# Patient Record
Sex: MALE | Race: WHITE | NOT HISPANIC OR LATINO | Employment: FULL TIME | URBAN - METROPOLITAN AREA
[De-identification: names, ages, dates, MRNs, and addresses within clinical notes are randomized per-mention and may not be internally consistent; named-entity substitution may affect disease eponyms.]

---

## 2017-01-25 ENCOUNTER — TELEPHONE (OUTPATIENT)
Dept: MEDICAL GROUP | Facility: PHYSICIAN GROUP | Age: 47
End: 2017-01-25

## 2017-01-25 NOTE — TELEPHONE ENCOUNTER
Called Jonn Arroyo and left a message to return my call regarding his/her upcoming NP appointment with Anjelica Gannon M.D..   If patient returns the call please transfer them to extension: [phone]@8:32

## 2017-01-27 NOTE — TELEPHONE ENCOUNTER
NEW PATIENT PRE-VISIT PLANNING    Called Jonn Arroyo in order to verify health topics prior to the New appointment.     1.  All medications were updated? yes    2.  Allergies were updated? yes    3.  All care teams were updated? no       •   Gait devices, O2, CPAP, etc: no        •   Eye professional: no       •   Other specialists (GYN, cardiology, endo, etc): no    4.  All pharmacies were updated? yes          No current outpatient prescriptions on file.     No current facility-administered medications for this visit.       5.  Patient may be due for these Health Maintenance Topics (update any if possible):            Health Maintenance Due   Topic Date Due   • IMM DTaP/Tdap/Td Vaccine (1 - Tdap) 04/13/1990   • IMM INFLUENZA (1) 09/01/2016                    6.  Immunizations were updated in [facility] using WebIZ?: Yes        a. Web Iz Recommendations: Tdap, Influenza          7.  Former PCP records requested?: N\A       a. If yes, request was sent to        8.  Notes to provider or MA:       deyanira         b.        Pt was encouraged to keep the appointment and to arrive at least 15-20 minutes early.

## 2017-01-27 NOTE — TELEPHONE ENCOUNTER
Called Jonn Arroyo and left a message to return my call regarding his/her upcoming NP appointment with Anjelica Gannon M.D..   If patient returns the call please transfer them to extension: [phone] @3:00

## 2017-02-02 ENCOUNTER — OFFICE VISIT (OUTPATIENT)
Dept: MEDICAL GROUP | Facility: PHYSICIAN GROUP | Age: 47
End: 2017-02-02
Payer: COMMERCIAL

## 2017-02-02 VITALS
OXYGEN SATURATION: 93 % | TEMPERATURE: 98.6 F | BODY MASS INDEX: 30.06 KG/M2 | DIASTOLIC BLOOD PRESSURE: 80 MMHG | WEIGHT: 210 LBS | HEART RATE: 82 BPM | RESPIRATION RATE: 16 BRPM | HEIGHT: 70 IN | SYSTOLIC BLOOD PRESSURE: 150 MMHG

## 2017-02-02 DIAGNOSIS — Z13.220 SCREENING, LIPID: ICD-10-CM

## 2017-02-02 DIAGNOSIS — Z00.00 ROUTINE PHYSICAL EXAMINATION: ICD-10-CM

## 2017-02-02 DIAGNOSIS — J06.9 ACUTE URI: ICD-10-CM

## 2017-02-02 DIAGNOSIS — Z13.1 SCREENING FOR DIABETES MELLITUS (DM): ICD-10-CM

## 2017-02-02 PROCEDURE — 99396 PREV VISIT EST AGE 40-64: CPT | Performed by: FAMILY MEDICINE

## 2017-02-02 ASSESSMENT — PATIENT HEALTH QUESTIONNAIRE - PHQ9: CLINICAL INTERPRETATION OF PHQ2 SCORE: 0

## 2017-02-02 NOTE — MR AVS SNAPSHOT
"        Jonn Arroyo   2017 2:00 PM   Office Visit   MRN: [id number]    Department:  [facility]   Dept Phone:  [phone]    Description:  Male : 1970   Provider:  Anjelica Gannon M.D.           Reason for Visit     Establish Care new pt       Allergies as of 2017     No Known Allergies      You were diagnosed with     Routine physical examination   [986945]       Acute URI   [062612]       Screening for diabetes mellitus (DM)   [421905]       Screening, lipid   [613524]         Vital Signs     Blood Pressure Pulse Temperature Respirations Height Weight    150/80 mmHg 82 37 °C (98.6 °F) 16 1.778 m (5' 10\") 95.255 kg (210 lb)    Body Mass Index Oxygen Saturation Smoking Status             30.13 kg/m2 93% Former Smoker         Basic Information     Date Of Birth Sex Race Ethnicity Preferred Language    1970 Male White Non- English      Health Maintenance        Date Due Completion Dates    IMM DTaP/Tdap/Td Vaccine (1 - Tdap) 1990    IMM INFLUENZA (1) 2018 (Originally 2016) 10/6/2011            Current Immunizations     DTP 1972    Influenza Vaccine Quad Inj (Preserved) 10/6/2011    MMR Vaccine 1992    Measles Vaccine-Historical Data 10/6/1972    OPV - Historical Data 1972      Below and/or attached are the medications your provider expects you to take. Review all of your home medications and newly ordered medications with your provider and/or pharmacist. Follow medication instructions as directed by your provider and/or pharmacist. Please keep your medication list with you and share with your provider. Update the information when medications are discontinued, doses are changed, or new medications (including over-the-counter products) are added; and carry medication information at all times in the event of emergency situations     Allergies:  No Known Allergies          Medications  Valid as of: 2017 -  3:14 PM    Generic Name " Brand Name Tablet Size Instructions for use    .                 Medicines prescribed today were sent to:     [facility] [address] [place] [address], [place] - [address] [facility]    [address] [facility] [place] [address]    Phone: [phone] Fax: [phone]    Open 24 Hours?: No      Medication refill instructions:       If your prescription bottle indicates you have medication refills left, it is not necessary to call your provider’s office. Please contact your pharmacy and they will refill your medication.    If your prescription bottle indicates you do not have any refills left, you may request refills at any time through one of the following ways: The online VeriTran system (except Urgent Care), by calling your provider’s office, or by asking your pharmacy to contact your provider’s office with a refill request. Medication refills are processed only during regular business hours and may not be available until the next business day. Your provider may request additional information or to have a follow-up visit with you prior to refilling your medication.   *Please Note: Medication refills are assigned a new Rx number when refilled electronically. Your pharmacy may indicate that no refills were authorized even though a new prescription for the same medication is available at the pharmacy. Please request the medicine by name with the pharmacy before contacting your provider for a refill.        Your To Do List     Future Labs/Procedures Complete By Expires    CBC WITH DIFFERENTIAL  As directed 2/3/2018    COMP METABOLIC PANEL  As directed 2/3/2018    LIPID PROFILE  As directed 2/3/2018         VeriTran Access Code: Activation code not generated  Current VeriTran Status: Active

## 2017-02-03 DIAGNOSIS — J20.9 ACUTE BRONCHITIS, UNSPECIFIED ORGANISM: ICD-10-CM

## 2017-02-03 RX ORDER — AZITHROMYCIN 250 MG/1
TABLET, FILM COATED ORAL
Qty: 6 TAB | Refills: 0 | Status: SHIPPED | OUTPATIENT
Start: 2017-02-03 | End: 2018-05-08

## 2017-02-03 NOTE — PROGRESS NOTES
Subjective:      Jonn Arroyo is a 46 y.o. male who presents with Establish Care            HPI       This is a 46-year-old white male who is here as a new patient to get established with me and to have physical exam. He said he was previously under the care of Dr. Mcghee.    He said he had stomach virus with diarrhea last week which cleared. He started having nasal congestion with yellowish to greenish nasal discharge and mild sinus pressure 3 days ago. He also started coughing with yellowish phlegm about 2 days ago. Today he started to feel better. He had some chills but no fever. He denies any shortness of breath. He has been taking [employer].    He is otherwise without any significant medical problems.    He said he is in between jobs right now.    I reviewed the following    History reviewed. No pertinent past medical history.     History reviewed. No pertinent past surgical history.    No Known Allergies    No current outpatient prescriptions on file.     No current facility-administered medications for this visit.        Family History   Problem Relation Age of Onset   • Cancer Father      thyroid       Social History     Social History   • Marital Status: Single     Spouse Name: N/A   • Number of Children: 1   • Years of Education: N/A     Occupational History   • Not on file.     Social History Main Topics   • Smoking status: Former Smoker -- 0.50 packs/day for 6 years     Quit date: 04/27/2009   • Smokeless tobacco: Former User   • Alcohol Use: No   • Drug Use: No   • Sexual Activity:     Partners: Female     Other Topics Concern   • Not on file     Social History Narrative        ROS     Review of Systems  Constitutional: Negative for fever, positive chills, negative weight loss and malaise/fatigue.   HEENT: Negative for ear pain, nosebleeds, sore throat and neck pain. Positive nasal congestion, positive nasal discharge    Eyes: Negative for blurred vision.   Respiratory: Positive for cough,  "sputum production, no shortness of breath and wheezing.    Cardiovascular: Negative for chest pain, palpitations, orthopnea and leg swelling.   Gastrointestinal: Negative for heartburn, nausea, vomiting and abdominal pain.   Genitourinary: Negative for dysuria, urgency and frequency.   Musculoskeletal: Negative for myalgias, back pain and joint pain.   Skin: Negative for rash and itching.   Neurological: Negative for dizziness, tingling, tremors, sensory change, focal weakness and headaches.   Endo/Heme/Allergies: Does not bruise/bleed easily.   Psychiatric/Behavioral: Negative for depression, anxiety, or memory loss.     All other systems reviewed and are negative except as in HPI.         Objective:     /80 mmHg  Pulse 82  Temp(Src) 37 °C (98.6 °F)  Resp 16  Ht 1.778 m (5' 10\")  Wt 95.255 kg (210 lb)  BMI 30.13 kg/m2  SpO2 93%     Physical Exam   Constitutional: He is oriented to person, place, and time. He appears well-developed and well-nourished. No distress.   HENT:   Head: Normocephalic and atraumatic.   Right Ear: External ear normal.   Left Ear: External ear normal.   Nose: Nose normal.   Mouth/Throat: Oropharynx is clear and moist. No oropharyngeal exudate.   Eyes: Conjunctivae and EOM are normal. Pupils are equal, round, and reactive to light. Right eye exhibits no discharge. Left eye exhibits no discharge. No scleral icterus.   Neck: Normal range of motion. Neck supple. No JVD present. No tracheal deviation present. No thyromegaly present.   Cardiovascular: Normal rate, regular rhythm, normal heart sounds and intact distal pulses.  Exam reveals no gallop and no friction rub.    No murmur heard.  Pulmonary/Chest: Effort normal and breath sounds normal. No stridor. No respiratory distress. He has no wheezes. He has no rales. He exhibits no tenderness.   Abdominal: Soft. Bowel sounds are normal. He exhibits no distension and no mass. There is no tenderness. There is no rebound and no guarding. " Hernia confirmed negative in the right inguinal area and confirmed negative in the left inguinal area.   Genitourinary: Testes normal and penis normal. Prostate is not tender. Right testis shows no mass, no swelling and no tenderness. Right testis is descended. Left testis shows no mass, no swelling and no tenderness. Left testis is descended. Circumcised. No phimosis, penile erythema or penile tenderness. No discharge found.   Musculoskeletal: Normal range of motion. He exhibits no edema or tenderness.   Lymphadenopathy:     He has no cervical adenopathy. No inguinal adenopathy noted on the right or left side.        Right: No inguinal adenopathy present.        Left: No inguinal adenopathy present.   Neurological: He is alert and oriented to person, place, and time. He displays normal reflexes. No cranial nerve deficit. He exhibits normal muscle tone. Coordination normal.   Skin: Skin is warm and dry. No rash noted. He is not diaphoretic. No erythema. No pallor.   Psychiatric: He has a normal mood and affect. His behavior is normal. Judgment and thought content normal.   Nursing note and vitals reviewed.                 Assessment/Plan:     1. Routine physical examination  Complete physical exam was done. We will send him for screening labs. Patient declined a shot.  - LIPID PROFILE; Future  - COMP METABOLIC PANEL; Future  - CBC WITH DIFFERENTIAL; Future    2. Acute URI  Most likely likely viral. We will do supportive measures. Advised increase by mouth fluids and rest. Call or return if symptoms persist by next week especially if they get worse.    3. Screening for diabetes mellitus (DM)  Screening labs ordered.  - LIPID PROFILE; Future  - COMP METABOLIC PANEL; Future  - CBC WITH DIFFERENTIAL; Future    4. Screening, lipid  Screening labs ordered.  - LIPID PROFILE; Future  - COMP METABOLIC PANEL; Future  - CBC WITH DIFFERENTIAL; Future    Return yearly for physical or sooner if needed.      Please note that this  dictation was created using voice recognition software. I have worked with consultants from the vendor as well as technical experts from [facility] to optimize the interface. I have made every reasonable attempt to correct obvious errors, but I expect that there are errors of grammar and possibly content I did not discover before finalizing the note.

## 2017-05-08 ENCOUNTER — OFFICE VISIT (OUTPATIENT)
Dept: URGENT CARE | Facility: CLINIC | Age: 47
End: 2017-05-08

## 2017-05-08 ENCOUNTER — NON-PROVIDER VISIT (OUTPATIENT)
Dept: URGENT CARE | Facility: CLINIC | Age: 47
End: 2017-05-08

## 2017-05-08 DIAGNOSIS — Z29.89 NEED FOR ISOLATION: ICD-10-CM

## 2017-05-08 DIAGNOSIS — Z01.89 RESPIRATORY CLEARANCE EXAMINATION, ENCOUNTER FOR: ICD-10-CM

## 2017-05-08 PROCEDURE — 94375 RESPIRATORY FLOW VOLUME LOOP: CPT | Performed by: PHYSICIAN ASSISTANT

## 2017-05-08 NOTE — MR AVS SNAPSHOT
Jonn Arroyo   2017 1:15 PM   Appointment   MRN: [id number]    Department:  [facility]   Dept Phone:  [phone]    Description:  Male : 1970   Provider:  [facility]           Allergies as of 2017     No Known Allergies      Vital Signs     Smoking Status                   Former Smoker           Basic Information     Date Of Birth Sex Race Ethnicity Preferred Language    1970 Male White Non- English      Health Maintenance        Date Due Completion Dates    IMM DTaP/Tdap/Td Vaccine (1 - Tdap) 1990            Current Immunizations     DTP 1972    Influenza Vaccine Quad Inj (Preserved) 10/6/2011    MMR Vaccine 1992    Measles Vaccine-Historical Data 10/6/1972    OPV - Historical Data 1972      Below and/or attached are the medications your provider expects you to take. Review all of your home medications and newly ordered medications with your provider and/or pharmacist. Follow medication instructions as directed by your provider and/or pharmacist. Please keep your medication list with you and share with your provider. Update the information when medications are discontinued, doses are changed, or new medications (including over-the-counter products) are added; and carry medication information at all times in the event of emergency situations     Allergies:  No Known Allergies          Medications  Valid as of: May 08, 2017 -  1:51 PM    Generic Name Brand Name Tablet Size Instructions for use    Azithromycin (Tab) ZITHROMAX 250 MG Take 2 tabs today then 1 tab daily for 4 days.        .                 Medicines prescribed today were sent to:     [facility] [address] [place] [id number]    Phone: [phone] Fax: [phone]    Open 24 Hours?: No      Medication refill instructions:       If your prescription bottle indicates you have medication refills left, it  is not necessary to call your provider’s office. Please contact your pharmacy and they will refill your medication.    If your prescription bottle indicates you do not have any refills left, you may request refills at any time through one of the following ways: The online Naubo system (except Urgent Care), by calling your provider’s office, or by asking your pharmacy to contact your provider’s office with a refill request. Medication refills are processed only during regular business hours and may not be available until the next business day. Your provider may request additional information or to have a follow-up visit with you prior to refilling your medication.   *Please Note: Medication refills are assigned a new Rx number when refilled electronically. Your pharmacy may indicate that no refills were authorized even though a new prescription for the same medication is available at the pharmacy. Please request the medicine by name with the pharmacy before contacting your provider for a refill.           Naubo Access Code: Activation code not generated  Current Naubo Status: Active

## 2017-05-09 NOTE — PROGRESS NOTES
Jonn Arroyo is a 47 y.o. male here for a non-provider visit for Mask Fit/ Resp Clearance 5/9/2017    If abnormal was an in office provider notified today (if so, indicate provider)? Yes  Routed to PCP? No

## 2018-05-08 ENCOUNTER — OFFICE VISIT (OUTPATIENT)
Dept: INTERNAL MEDICINE | Facility: MEDICAL CENTER | Age: 48
End: 2018-05-08
Payer: COMMERCIAL

## 2018-05-08 VITALS
HEIGHT: 68 IN | TEMPERATURE: 99 F | WEIGHT: 201 LBS | HEART RATE: 79 BPM | BODY MASS INDEX: 30.46 KG/M2 | OXYGEN SATURATION: 97 % | DIASTOLIC BLOOD PRESSURE: 68 MMHG | SYSTOLIC BLOOD PRESSURE: 132 MMHG

## 2018-05-08 DIAGNOSIS — E66.9 OBESITY (BMI 30.0-34.9): ICD-10-CM

## 2018-05-08 DIAGNOSIS — Z79.1 NSAID LONG-TERM USE: ICD-10-CM

## 2018-05-08 DIAGNOSIS — J30.89 ALLERGY TO DUST: ICD-10-CM

## 2018-05-08 DIAGNOSIS — R51.9 INTRACTABLE HEADACHE, UNSPECIFIED CHRONICITY PATTERN, UNSPECIFIED HEADACHE TYPE: ICD-10-CM

## 2018-05-08 PROCEDURE — 99204 OFFICE O/P NEW MOD 45 MIN: CPT | Mod: GC | Performed by: INTERNAL MEDICINE

## 2018-05-08 RX ORDER — IBUPROFEN 600 MG/1
600 TABLET ORAL EVERY 6 HOURS PRN
COMMUNITY

## 2018-05-08 ASSESSMENT — PATIENT HEALTH QUESTIONNAIRE - PHQ9: CLINICAL INTERPRETATION OF PHQ2 SCORE: 0

## 2018-05-08 NOTE — LETTER
May 8, 2018    To Whom It May Concern:         This is confirmation that Mr. Arroyo attended his scheduled appointment with 5/8/2018. Due to his headache that is worsened with smelling the chemical, it would be best for him to be transferred to another department that does not let him have direct contact and not smell the chemical to determine if the headache is related.         If you have any questions please do not hesitate to call me at the phone number listed below.    Sincerely,          Rashida Toney M.D.  [phone]

## 2018-05-08 NOTE — PROGRESS NOTES
New Patient to Establish    Reason to establish: New patient to establish    CC: 47 yo M presents with headache for 1 year    HPI:      Intractable headache  Patient has been having headache for one year.  He started to work for [employer] since 2017. He started to have frontal headache about 3 months later. Frontal headache, 3/10 in severity, dull, constant, sometimes has occipital headache as well. He also has worsening attacks of headache 5-9/10, sharp. The headache is worse with smelling the chemical while at work, noises, bright light; better with rest, dark, quiet, sleep.    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 No pertinent past medical history.    Current Outpatient Prescriptions   Medication Sig Dispense Refill   • ibuprofen (MOTRIN) 600 MG Tab Take 600 mg by mouth every 6 hours as needed for Headache.       No current facility-administered medications for this visit.        Allergies as of 05/08/2018   • (No Known Allergies)       Social History     Social History   • Marital status:      Spouse name: N/A   • Number of children: 1   • Years of education: N/A     Occupational History   • Not on file.     Social History Main Topics   • Smoking status: Former Smoker     Packs/day: 0.50     Years: 6.00     Quit date: 4/27/2009   • Smokeless tobacco: Former User   • Alcohol use No   • Drug use: No      Comment: rarely smoked marijuana when visited his brothera while ago.   • Sexual activity: Yes     Partners: Female     Other Topics Concern   • Not on file     Social History Narrative   • No narrative on file       Family History   Problem Relation Age of Onset   • Cancer Father   "    thyroid       Past Surgical History:   Procedure Laterality Date   • OTHER Bilateral     LASIK eye surgery       ROS: As per HPI. Additional pertinent symptoms as noted below.    ROS  Constitutional: Denies fevers or chills  Eyes: Denies changes in vision  Ears/Nose/Throat/Mouth: Denies nasal congestion or sore throat   Cardiovascular: Denies chest pain or palpitations   Respiratory: Denies shortness of breath , Denies cough  Gastrointestinal/Hepatic: Denies abd pain, nausea, vomiting   Genitourinary: Denies dysuria or frequency  Musculoskeletal/Rheum: Denies joint pain and swelling   Neurological: Denies headache  Psychiatric: Denies mood disorder   Endocrine: Denies hx of diabetes or thyroid dysfunction  Heme/Oncology/Lymph Nodes: Denies weight changes or enlarged LNs.    /68   Pulse 79   Temp 37.2 °C (99 °F)   Ht 1.727 m (5' 8\")   Wt 91.2 kg (201 lb)   SpO2 97%   BMI 30.56 kg/m²     Physical Exam  General:  Alert and oriented, No apparent distress.    Eyes: Pupils equal and reactive. No scleral icterus.    Throat: Clear no erythema or exudates noted.    Neck: Supple. No lymphadenopathy noted. Thyroid not enlarged.    Lungs: Clear to auscultation and percussion bilaterally.    Cardiovascular: Regular rate and rhythm. No murmurs, rubs or gallops.    Abdomen:  Benign. No rebound or guarding noted.    Extremities: No clubbing, cyanosis, edema.    Neurology: CN 2-12 grossly intact, no focal neuro deficits.    Skin: Clear. No rash or suspicious skin lesions noted.      Assessment and Plan    49 yo M presents with headache for 1 week.     Intractable headache  Patient has been having headache for one year.  He started to work for [employer] since 2017. He started to have frontal headache about 3 months later. Frontal headache, 3/10 in severity, dull, constant, sometimes has occipital headache as well. He also has worsening attacks of headache 5-9/10, sharp that he needs to take ibuprofen. " The headache is worse with smelling the chemical while at work, noises, bright light; better with rest, dark, quiet, sleep.  plan  - MR-BRAIN-WITH & W/O; Future  - tylenol prn for mild to moderate pain  - OTC ibuprofen prn for severe pain   - keep headache diary and bring it in the next visit  - avoid triggers  - work letter provided    Obesity (BMI 30.0-34.9)  BMI 30.56  - LIPID PROFILE; Future  - TSH WITH REFLEX TO FT4; Future    NSAID long-term use  Takes about 300 mg ibuprofen 5 times per week  - COMP METABOLIC PANEL; Future    Screen for anemia  - CBC WITH DIFFERENTIAL; Future    Preventive care  Flu -next one in fall  TD- NA  TDaP - discuss in the next visti  Pneumococcal - NA  Prevnar - NA  Colonoscopy - NA  Shingrix- NA        Follow-up:Return in about 4 weeks (around 6/5/2018) for Long.    Signed by: Rashida Toney M.D.

## 2018-05-14 ENCOUNTER — TELEPHONE (OUTPATIENT)
Dept: INTERNAL MEDICINE | Facility: MEDICAL CENTER | Age: 48
End: 2018-05-14

## 2018-05-14 NOTE — TELEPHONE ENCOUNTER
"Pt came in and left a [employer] Request Form for PCP to fill out. Pt states in a sticky note attached \"thank you for the letter although it turns out its not sufficient. This form needs to be submitted.  "

## 2018-05-17 ENCOUNTER — HOSPITAL ENCOUNTER (OUTPATIENT)
Dept: LAB | Facility: MEDICAL CENTER | Age: 48
End: 2018-05-17
Attending: INTERNAL MEDICINE
Payer: COMMERCIAL

## 2018-05-17 DIAGNOSIS — Z79.1 NSAID LONG-TERM USE: ICD-10-CM

## 2018-05-17 DIAGNOSIS — E66.9 OBESITY (BMI 30.0-34.9): ICD-10-CM

## 2018-05-17 DIAGNOSIS — J30.89 ALLERGY TO DUST: ICD-10-CM

## 2018-05-17 LAB
ALBUMIN SERPL BCP-MCNC: 4.6 G/DL (ref 3.2–4.9)
ALBUMIN/GLOB SERPL: 1.8 G/DL
ALP SERPL-CCNC: 48 U/L (ref 30–99)
ALT SERPL-CCNC: 22 U/L (ref 2–50)
ANION GAP SERPL CALC-SCNC: 7 MMOL/L (ref 0–11.9)
AST SERPL-CCNC: 23 U/L (ref 12–45)
BASOPHILS # BLD AUTO: 0.5 % (ref 0–1.8)
BASOPHILS # BLD: 0.03 K/UL (ref 0–0.12)
BILIRUB SERPL-MCNC: 1 MG/DL (ref 0.1–1.5)
BUN SERPL-MCNC: 14 MG/DL (ref 8–22)
CALCIUM SERPL-MCNC: 9.5 MG/DL (ref 8.5–10.5)
CHLORIDE SERPL-SCNC: 104 MMOL/L (ref 96–112)
CHOLEST SERPL-MCNC: 142 MG/DL (ref 100–199)
CO2 SERPL-SCNC: 26 MMOL/L (ref 20–33)
CREAT SERPL-MCNC: 0.79 MG/DL (ref 0.5–1.4)
EOSINOPHIL # BLD AUTO: 0.1 K/UL (ref 0–0.51)
EOSINOPHIL NFR BLD: 1.8 % (ref 0–6.9)
ERYTHROCYTE [DISTWIDTH] IN BLOOD BY AUTOMATED COUNT: 41.1 FL (ref 35.9–50)
GLOBULIN SER CALC-MCNC: 2.6 G/DL (ref 1.9–3.5)
GLUCOSE SERPL-MCNC: 84 MG/DL (ref 65–99)
HCT VFR BLD AUTO: 48.6 % (ref 42–52)
HDLC SERPL-MCNC: 59 MG/DL
HGB BLD-MCNC: 15.9 G/DL (ref 14–18)
IMM GRANULOCYTES # BLD AUTO: 0.01 K/UL (ref 0–0.11)
IMM GRANULOCYTES NFR BLD AUTO: 0.2 % (ref 0–0.9)
LDLC SERPL CALC-MCNC: 72 MG/DL
LYMPHOCYTES # BLD AUTO: 1.78 K/UL (ref 1–4.8)
LYMPHOCYTES NFR BLD: 31.5 % (ref 22–41)
MCH RBC QN AUTO: 28.9 PG (ref 27–33)
MCHC RBC AUTO-ENTMCNC: 32.7 G/DL (ref 33.7–35.3)
MCV RBC AUTO: 88.4 FL (ref 81.4–97.8)
MONOCYTES # BLD AUTO: 0.71 K/UL (ref 0–0.85)
MONOCYTES NFR BLD AUTO: 12.6 % (ref 0–13.4)
NEUTROPHILS # BLD AUTO: 3.02 K/UL (ref 1.82–7.42)
NEUTROPHILS NFR BLD: 53.4 % (ref 44–72)
NRBC # BLD AUTO: 0 K/UL
NRBC BLD-RTO: 0 /100 WBC
PLATELET # BLD AUTO: 247 K/UL (ref 164–446)
PMV BLD AUTO: 9.5 FL (ref 9–12.9)
POTASSIUM SERPL-SCNC: 4.1 MMOL/L (ref 3.6–5.5)
PROT SERPL-MCNC: 7.2 G/DL (ref 6–8.2)
RBC # BLD AUTO: 5.5 M/UL (ref 4.7–6.1)
SODIUM SERPL-SCNC: 137 MMOL/L (ref 135–145)
TRIGL SERPL-MCNC: 54 MG/DL (ref 0–149)
TSH SERPL DL<=0.005 MIU/L-ACNC: 0.67 UIU/ML (ref 0.38–5.33)
WBC # BLD AUTO: 5.7 K/UL (ref 4.8–10.8)

## 2018-05-17 PROCEDURE — 84443 ASSAY THYROID STIM HORMONE: CPT

## 2018-05-17 PROCEDURE — 80061 LIPID PANEL: CPT

## 2018-05-17 PROCEDURE — 85025 COMPLETE CBC W/AUTO DIFF WBC: CPT

## 2018-05-17 PROCEDURE — 36415 COLL VENOUS BLD VENIPUNCTURE: CPT

## 2018-05-17 PROCEDURE — 80053 COMPREHEN METABOLIC PANEL: CPT

## 2018-05-22 ENCOUNTER — HOSPITAL ENCOUNTER (OUTPATIENT)
Dept: RADIOLOGY | Facility: MEDICAL CENTER | Age: 48
End: 2018-05-22
Attending: INTERNAL MEDICINE
Payer: COMMERCIAL

## 2018-05-22 DIAGNOSIS — R51.9 INTRACTABLE HEADACHE, UNSPECIFIED CHRONICITY PATTERN, UNSPECIFIED HEADACHE TYPE: ICD-10-CM

## 2018-05-22 PROCEDURE — 700117 HCHG RX CONTRAST REV CODE 255: Performed by: INTERNAL MEDICINE

## 2018-05-22 PROCEDURE — 70553 MRI BRAIN STEM W/O & W/DYE: CPT

## 2018-05-22 PROCEDURE — A9579 GAD-BASE MR CONTRAST NOS,1ML: HCPCS | Performed by: INTERNAL MEDICINE

## 2018-05-22 RX ADMIN — GADODIAMIDE 20 ML: 287 INJECTION INTRAVENOUS at 10:48

## 2018-06-05 ENCOUNTER — OFFICE VISIT (OUTPATIENT)
Dept: INTERNAL MEDICINE | Facility: MEDICAL CENTER | Age: 48
End: 2018-06-05
Payer: COMMERCIAL

## 2018-06-05 VITALS
SYSTOLIC BLOOD PRESSURE: 126 MMHG | BODY MASS INDEX: 30.1 KG/M2 | WEIGHT: 198.6 LBS | HEIGHT: 68 IN | HEART RATE: 71 BPM | DIASTOLIC BLOOD PRESSURE: 76 MMHG | OXYGEN SATURATION: 96 % | TEMPERATURE: 97.9 F

## 2018-06-05 DIAGNOSIS — Z79.1 NSAID LONG-TERM USE: ICD-10-CM

## 2018-06-05 DIAGNOSIS — Z00.00 PREVENTATIVE HEALTH CARE: ICD-10-CM

## 2018-06-05 DIAGNOSIS — R51.9 INTRACTABLE HEADACHE, UNSPECIFIED CHRONICITY PATTERN, UNSPECIFIED HEADACHE TYPE: ICD-10-CM

## 2018-06-05 PROCEDURE — 99214 OFFICE O/P EST MOD 30 MIN: CPT | Mod: GC | Performed by: INTERNAL MEDICINE

## 2018-06-05 NOTE — PATIENT INSTRUCTIONS
"Wear protective equipment (PPE) while at work   Stay hydrated with water  Eat a healthy diet and exercise at least 5 x weekly   Follow up in 1 year   Take Ibuprofen as needed, take with food and only if needed, taking daily can cause rebound headaches     Headaches, Frequently Asked Questions  MIGRAINE HEADACHES  Q: What is migraine? What causes it? How can I treat it?  A: Generally, migraine headaches begin as a dull ache. Then they develop into a constant, throbbing, and pulsating pain. You may experience pain at the temples. You may experience pain at the front or back of one or both sides of the head. The pain is usually accompanied by a combination of:  · Nausea.   · Vomiting.   · Sensitivity to light and noise.   Some people (about 15%) experience an aura (see below) before an attack. The cause of migraine is believed to be chemical reactions in the brain. Treatment for migraine may include over-the-counter or prescription medications. It may also include self-help techniques. These include relaxation training and biofeedback.   Q: What is an aura?  A: About 15% of people with migraine get an \"aura\". This is a sign of neurological symptoms that occur before a migraine headache. You may see wavy or jagged lines, dots, or flashing lights. You might experience tunnel vision or blind spots in one or both eyes. The aura can include visual or auditory hallucinations (something imagined). It may include disruptions in smell (such as strange odors), taste or touch. Other symptoms include:  · Numbness.   · A \"pins and needles\" sensation.   · Difficulty in recalling or speaking the correct word.   These neurological events may last as long as 60 minutes. These symptoms will fade as the headache begins.  Q: What is a trigger?  A: Certain physical or environmental factors can lead to or \"trigger\" a migraine. These include:  · Foods.   · Hormonal changes.   · Weather.   · Stress.   It is important to remember that triggers " "are different for everyone. To help prevent migraine attacks, you need to figure out which triggers affect you. Keep a headache diary. This is a good way to track triggers. The diary will help you talk to your healthcare professional about your condition.  Q: Does weather affect migraines?  A: Bright sunshine, hot, humid conditions, and drastic changes in barometric pressure may lead to, or \"trigger,\" a migraine attack in some people. But studies have shown that weather does not act as a trigger for everyone with migraines.  Q: What is the link between migraine and hormones?  A: Hormones start and regulate many of your body's functions. Hormones keep your body in balance within a constantly changing environment. The levels of hormones in your body are unbalanced at times. Examples are during menstruation, pregnancy, or menopause. That can lead to a migraine attack. In fact, about three quarters of all women with migraine report that their attacks are related to the menstrual cycle.   Q: Is there an increased risk of stroke for migraine sufferers?  A: The likelihood of a migraine attack causing a stroke is very remote. That is not to say that migraine sufferers cannot have a stroke associated with their migraines. In persons under age 40, the most common associated factor for stroke is migraine headache. But over the course of a person's normal life span, the occurrence of migraine headache may actually be associated with a reduced risk of dying from cerebrovascular disease due to stroke.   Q: What are acute medications for migraine?  A: Acute medications are used to treat the pain of the headache after it has started. Examples over-the-counter medications, NSAIDs, ergots, and triptans.   Q: What are the triptans?  A: Triptans are the newest class of abortive medications. They are specifically targeted to treat migraine. Triptans are vasoconstrictors. They moderate some chemical reactions in the brain. The triptans " "work on receptors in your brain. Triptans help to restore the balance of a neurotransmitter called serotonin. Fluctuations in levels of serotonin are thought to be a main cause of migraine.   Q: Are over-the-counter medications for migraine effective?  A: Over-the-counter, or \"OTC,\" medications may be effective in relieving mild to moderate pain and associated symptoms of migraine. But you should see your caregiver before beginning any treatment regimen for migraine.   Q: What are preventive medications for migraine?  A: Preventive medications for migraine are sometimes referred to as \"prophylactic\" treatments. They are used to reduce the frequency, severity, and length of migraine attacks. Examples of preventive medications include antiepileptic medications, antidepressants, beta-blockers, calcium channel blockers, and NSAIDs (nonsteroidal anti-inflammatory drugs).  Q: Why are anticonvulsants used to treat migraine?  A: During the past few years, there has been an increased interest in antiepileptic drugs for the prevention of migraine. They are sometimes referred to as \"anticonvulsants\". Both epilepsy and migraine may be caused by similar reactions in the brain.   Q: Why are antidepressants used to treat migraine?  A: Antidepressants are typically used to treat people with depression. They may reduce migraine frequency by regulating chemical levels, such as serotonin, in the brain.   Q: What alternative therapies are used to treat migraine?  A: The term \"alternative therapies\" is often used to describe treatments considered outside the scope of conventional Western medicine. Examples of alternative therapy include acupuncture, acupressure, and yoga. Another common alternative treatment is herbal therapy. Some herbs are believed to relieve headache pain. Always discuss alternative therapies with your caregiver before proceeding. Some herbal products contain arsenic and other toxins.  TENSION HEADACHES  Q: What is a " "tension-type headache? What causes it? How can I treat it?  A: Tension-type headaches occur randomly. They are often the result of temporary stress, anxiety, fatigue, or anger. Symptoms include soreness in your temples, a tightening band-like sensation around your head (a \"vice-like\" ache). Symptoms can also include a pulling feeling, pressure sensations, and alissa head and neck muscles. The headache begins in your forehead, temples, or the back of your head and neck. Treatment for tension-type headache may include over-the-counter or prescription medications. Treatment may also include self-help techniques such as relaxation training and biofeedback.  CLUSTER HEADACHES  Q: What is a cluster headache? What causes it? How can I treat it?  A: Cluster headache gets its name because the attacks come in groups. The pain arrives with little, if any, warning. It is usually on one side of the head. A tearing or bloodshot eye and a runny nose on the same side of the headache may also accompany the pain. Cluster headaches are believed to be caused by chemical reactions in the brain. They have been described as the most severe and intense of any headache type. Treatment for cluster headache includes prescription medication and oxygen.  SINUS HEADACHES  Q: What is a sinus headache? What causes it? How can I treat it?  A: When a cavity in the bones of the face and skull (a sinus) becomes inflamed, the inflammation will cause localized pain. This condition is usually the result of an allergic reaction, a tumor, or an infection. If your headache is caused by a sinus blockage, such as an infection, you will probably have a fever. An x-ray will confirm a sinus blockage. Your caregiver's treatment might include antibiotics for the infection, as well as antihistamines or decongestants.   REBOUND HEADACHES  Q: What is a rebound headache? What causes it? How can I treat it?  A: A pattern of taking acute headache medications too " "often can lead to a condition known as \"rebound headache.\" A pattern of taking too much headache medication includes taking it more than 2 days per week or in excessive amounts. That means more than the label or a caregiver advises. With rebound headaches, your medications not only stop relieving pain, they actually begin to cause headaches. Doctors treat rebound headache by tapering the medication that is being overused. Sometimes your caregiver will gradually substitute a different type of treatment or medication. Stopping may be a challenge. Regularly overusing a medication increases the potential for serious side effects. Consult a caregiver if you regularly use headache medications more than 2 days per week or more than the label advises.  ADDITIONAL QUESTIONS AND ANSWERS  Q: What is biofeedback?  A: Biofeedback is a self-help treatment. Biofeedback uses special equipment to monitor your body's involuntary physical responses. Biofeedback monitors:  · Breathing.   · Pulse.   · Heart rate.   · Temperature.   · Muscle tension.   · Brain activity.   Biofeedback helps you refine and perfect your relaxation exercises. You learn to control the physical responses that are related to stress. Once the technique has been mastered, you do not need the equipment any more.  Q: Are headaches hereditary?  A: Four out of five (80%) of people that suffer report a family history of migraine. Scientists are not sure if this is genetic or a family predisposition. Despite the uncertainty, a child has a 50% chance of having migraine if one parent suffers. The child has a 75% chance if both parents suffer.   Q: Can children get headaches?  A: By the time they reach high school, most young people have experienced some type of headache. Many safe and effective approaches or medications can prevent a headache from occurring or stop it after it has begun.   Q: What type of doctor should I see to diagnose and treat my headache?  A: Start " with your primary caregiver. Discuss his or her experience and approach to headaches. Discuss methods of classification, diagnosis, and treatment. Your caregiver may decide to recommend you to a headache specialist, depending upon your symptoms or other physical conditions. Having diabetes, allergies, etc., may require a more comprehensive and inclusive approach to your headache. The National Headache Foundation will provide, upon request, a list of NHF physician members in your state.  Document Released: 03/09/2005 Document Revised: 03/11/2013 Document Reviewed: 08/17/2009  [employer] Patient Information ©2013 [employer].

## 2018-06-05 NOTE — PROGRESS NOTES
Established Patient    Jonn Bundy presents today with the following:    CC: Re-establish, follow up in headaches     HPI:     Mr. rAroyo is a very pleasant 49 yo male with no PMHx who presents to re-establish care and follow up on  His chronic headaches that he has been experiencing for last 6 months approximately. Headaches started soon after starting his new job at [employer]. Headaches are constant and dull with intermittent intensity. Pt was recently evaluated with MRI which was negative. Headaches improved after patient started to wear a mask to help protect him from the smells and chemicals at his job. He notes that others at his workplace also complain of headaches. He has recently changed departments and does have some improvement in his headaches. He denies allergic symptoms, difficulties sleeping, substance use or neck pain that could also be triggers. He takes ibuprofen only as needed, he wants to hold off on any further medications at this time, he will continue his headache diary and try to eliminate triggers, his ultimate plan is to change employment.   Other than headaches, patient has no other complaints, he denies chest pain, SOB, or abdominal pain. He does have hx of constipation and had colonoscopy 8 years ago at [facility] which was normal, he no longer complains of constipation and denies hematochezia or melena.       Patient Active Problem List    Diagnosis Date Noted   • Intractable headache 05/08/2018   • Obesity (BMI 30.0-34.9) 05/08/2018   • NSAID long-term use 05/08/2018   • Allergy to dust 05/08/2018       Current Outpatient Prescriptions   Medication Sig Dispense Refill   • ibuprofen (MOTRIN) 600 MG Tab Take 600 mg by mouth every 6 hours as needed for Headache.       No current facility-administered medications for this visit.        ROS: As per HPI. Additional pertinent symptoms as noted below.    Constitutional: denies fever, chills or night sweats, no unintensional weight  "loss  Eyes: denies blurry or loss of vision   ENT: denies sore throat, congestion, difficulty swallowing   Cardiovascular: denies chest pain or palpitations   Respiratory: denies shortness of breath, cough or wheezing   GI: denies abdominal pain, diarrhea/constipation, melena or hematochezia   : denies dysuria or urinary frequency   Musculo-skeletal: denies muscle/joint pain or swelling   Skin: denies skin rash or itch, + \"susy\" appearance of face  Neurological: denies numbness, tingling or focal deficits   Psychological: denies depression, anxiety or substance use    /76   Pulse 71   Temp 36.6 °C (97.9 °F)   Ht 1.727 m (5' 8\")   Wt 90.1 kg (198 lb 9.6 oz)   SpO2 96%   BMI 30.20 kg/m²     Physical Exam   Constitutional:  Well appearing male, sitting comfortably, appears stated age, in no acute distress   Eyes: Pupils are equal, round, and reactive to light. No scleral icterus.  Neck: Neck supple. No thyromegaly present.   Cardiovascular: Normal rate, regular rhythm and normal heart sounds.  Exam reveals no gallop and no friction rub.  No murmur heard.  Pulmonary/Chest: lungs clear to ascultation bilaterally, no wheezing, rales or rhonchi   Musculoskeletal:  No joint tenderness or swelling   Lymphadenopathy: no cervical adenopathy  Neurological: alert and oriented to person, place, and time. Strength and sensation intact, no focal deficits   Skin: No cyanosis. Nails show no clubbing. Mild teleangiectasia across nasal bridge        Assessment and Plan    1. Intractable Headache   - chronic headache, daily, some days worse than others, improving with PPE at work   - likely related to noxious odor/chemicals at work   - MRI normal  - labs unremarkable   - educated pt about NSAID use and possible rebound headaches, has cut down use   - pt elects no medication at this time, which is appropriate will attempt to make adjustments with working environment     2. NSAID long-term use  - takes ibuprofen BID for " headaches   - educated on possible overuse headache, has cut down use   - will alternate with tylenol as needed     3. Preventative care   - lab work unremarkable   - lipid panel within normal limits   - does not meet criteria for colonoscopy, PNA vaccine   - needs Tdap at next visit     Followup: Return in about 1 year (around 6/5/2019), or if symptoms worsen or fail to improve.      Signed by: Katty Ferris M.D.

## 2018-12-21 ENCOUNTER — OCCUPATIONAL MEDICINE (OUTPATIENT)
Dept: URGENT CARE | Facility: CLINIC | Age: 48
End: 2018-12-21
Payer: COMMERCIAL

## 2018-12-21 VITALS
DIASTOLIC BLOOD PRESSURE: 70 MMHG | OXYGEN SATURATION: 98 % | BODY MASS INDEX: 31.04 KG/M2 | HEIGHT: 68 IN | SYSTOLIC BLOOD PRESSURE: 128 MMHG | HEART RATE: 82 BPM | TEMPERATURE: 98.7 F | WEIGHT: 204.8 LBS | RESPIRATION RATE: 14 BRPM

## 2018-12-21 DIAGNOSIS — R11.0 NAUSEA: ICD-10-CM

## 2018-12-21 DIAGNOSIS — Z77.098 CHEMICAL EXPOSURE: ICD-10-CM

## 2018-12-21 DIAGNOSIS — R51.9 ACUTE NONINTRACTABLE HEADACHE, UNSPECIFIED HEADACHE TYPE: ICD-10-CM

## 2018-12-21 LAB
AMP AMPHETAMINE: NORMAL
BAR BARBITURATES: NORMAL
BREATH ALCOHOL COMMENT: NORMAL
BZO BENZODIAZEPINES: NORMAL
COC COCAINE: NORMAL
INT CON NEG: NORMAL
INT CON POS: NORMAL
MDMA ECSTASY: NORMAL
MET METHAMPHETAMINES: NORMAL
MTD METHADONE: NORMAL
OPI OPIATES: NORMAL
OXY OXYCODONE: NORMAL
PCP PHENCYCLIDINE: NORMAL
POC BREATHALIZER: 0 PERCENT (ref 0–0.01)
POC URINE DRUG SCREEN OCDRS: NEGATIVE
THC: NORMAL

## 2018-12-21 PROCEDURE — 82075 ASSAY OF BREATH ETHANOL: CPT | Performed by: FAMILY MEDICINE

## 2018-12-21 PROCEDURE — 99214 OFFICE O/P EST MOD 30 MIN: CPT | Performed by: FAMILY MEDICINE

## 2018-12-21 PROCEDURE — 80305 DRUG TEST PRSMV DIR OPT OBS: CPT | Performed by: FAMILY MEDICINE

## 2018-12-21 RX ORDER — IBUPROFEN 200 MG
600 TABLET ORAL ONCE
Status: COMPLETED | OUTPATIENT
Start: 2018-12-21 | End: 2018-12-21

## 2018-12-21 RX ORDER — ONDANSETRON 4 MG/1
4 TABLET, ORALLY DISINTEGRATING ORAL ONCE
Status: COMPLETED | OUTPATIENT
Start: 2018-12-21 | End: 2018-12-21

## 2018-12-21 RX ADMIN — Medication 600 MG: at 11:00

## 2018-12-21 RX ADMIN — ONDANSETRON 4 MG: 4 TABLET, ORALLY DISINTEGRATING ORAL at 11:00

## 2018-12-21 ASSESSMENT — ENCOUNTER SYMPTOMS
EYE DISCHARGE: 0
MYALGIAS: 0
SENSORY CHANGE: 0
FOCAL WEAKNESS: 0
CHILLS: 0
EYE REDNESS: 0
FEVER: 0

## 2018-12-21 NOTE — LETTER
[facility]  [address] [facility] [place] [address]  Phone:  [phone] - Fax:  [phone]   [facility] Progress Report and Disability Certification  Date of Service: 12/21/2018   No Show:  No  Date / Time of Next Visit: 12/25/2018 [facility] urgent care with me   Claim Information   Patient Name: Jonn Arroyo  Claim Number:     Employer: [employer]  Date of Injury: 12/21/2018     Insurer / TPA: [employer]  ID / SSN:     Occupation:   Diagnosis: Diagnoses of Chemical exposure, Nausea, Acute nonintractable headache, unspecified headache type, and Pre-employment drug screening were pertinent to this visit.    Medical Information   Related to Industrial Injury? Yes    Subjective Complaints:  DOI: 12/21/2018  Exposure to [employer] battery electrolyte. Caps on batteries popped. He inhaled the fumes without mask. Approx 15min exposure time. No liquid exposure or ingestion. He has been exposed this in the past that has caused headache. He currently has a headache and nausea associated with exposure. Symptoms onset almost immediately. No coughing or choking. No SOB/wheeze. No mental status changes. No weakness. No treatments tried     Objective Findings: HEENT: ATNC, nasal and oral mucous membranes clear, conjunctiva clear  Pulm: clear to auscultation  Neuro: no weakness   Pre-Existing Condition(s):     Assessment:   Initial Visit    Status: Additional Care Required  Permanent Disability:No    Plan:   Comments:Discussed with poison control center. Continue ibuprofen as needed.    Diagnostics:   Comments:NA    Comments:       Disability Information   Status: Temporarily Totally Disabled    From:  12/21/2018  Through: 12/25/2018 Restrictions are: Temporary   Physical Restrictions   Sitting:    Standing:    Stooping:    Bending:      Squatting:    Walking:    Climbing:    Pushing:     Pulling:    Other:    Reaching Above Shoulder (L):   Reaching Above Shoulder (R):       Reaching Below Shoulder (L):    Reaching Below Shoulder (R):      Not to exceed Weight Limits   Carrying(hrs):   Weight Limit(lb):   Lifting(hrs):   Weight  Limit(lb):     Comments:      Repetitive Actions   Hands: i.e. Fine Manipulations from Grasping:     Feet: i.e. Operating Foot Controls:     Driving / Operate Machinery:     Physician Name: Akhil Sage M.D. Physician Signature: AKHIL Gonzales M.D. e-Signature: Dr. Jaden Garcia, Medical Director   Clinic Name / Location: [address], [address]  [place] [address] Clinic Phone Number: Dept: [phone]   Appointment Time: 10:00 Am Visit Start Time: 10:33 AM   Check-In Time:  9:59 Am Visit Discharge Time:  11:20 AM   Original-Treating Physician or Chiropractor    Page 2-Insurer/TPA    Page 3-Employer    Page 4-Employee

## 2018-12-21 NOTE — PROGRESS NOTES
"Subjective:      Jonn Arroyo is a 48 y.o. male who presents with Chemical Exposure (head ache / nausea / )      DOI: 12/21/2018  Exposure to [employer] battery electrolyte. Caps on batteries popped. He inhaled the fumes without mask. Approx 15min exposure time. No liquid exposure or ingestion. He has been exposed this in the past that has caused headache. He currently has a headache and nausea associated with exposure. Symptoms onset almost immediately. No coughing or choking. No SOB/wheeze. No mental status changes. No weakness. No treatments tried       HPI    Review of Systems   Constitutional: Negative for chills and fever.   Eyes: Negative for discharge and redness.   Cardiovascular: Negative for chest pain.   Musculoskeletal: Negative for joint pain and myalgias.   Skin: Negative for itching and rash.   Neurological: Negative for sensory change and focal weakness.     .  Medications, Allergies, and current problem list reviewed today in Epic       Objective:     /70 (BP Location: Left arm, Patient Position: Sitting, BP Cuff Size: Large adult)   Pulse 82   Temp 37.1 °C (98.7 °F) (Temporal)   Resp 14   Ht 1.727 m (5' 8\")   Wt 92.9 kg (204 lb 12.8 oz)   SpO2 98%   BMI 31.14 kg/m²      Physical Exam   Constitutional: He is oriented to person, place, and time. He appears well-developed and well-nourished. No distress.   HENT:   Head: Normocephalic and atraumatic.   Eyes: Conjunctivae are normal.   Cardiovascular: Normal rate, regular rhythm and normal heart sounds.    Neurological: He is alert and oriented to person, place, and time.   Skin: Skin is warm and dry. No rash noted.       HEENT: ATNC, nasal and oral mucous membranes clear, conjunctiva clear  Pulm: clear to auscultation  Neuro: no weakness       Assessment/Plan:     1. Chemical exposure     2. Nausea  ondansetron (ZOFRAN ODT) dispertab 4 mg   3. Acute nonintractable headache, unspecified headache type  ibuprofen (MOTRIN) tablet " 600 mg     Differential diagnosis, natural history, supportive care, and indications for immediate follow-up discussed at length.     Discussed with Poison Control Center.  They note if there is no coughing or choking then inhalation exposure is unlikely to cause systemic symptoms the most concerning being paralysis.  Patient is stable on discharge.  He is not scheduled again until the 26th.  I will make him TTD until the 25th and he will see me at our [facility] site on that day.

## 2018-12-21 NOTE — LETTER
"EMPLOYEE’S CLAIM FOR COMPENSATION/ REPORT OF INITIAL TREATMENT  FORM C-4    EMPLOYEE’S CLAIM - PROVIDE ALL INFORMATION REQUESTED   First Name  Jonn Bundy Last Name  Cruz Birthdate                    1970                Sex  male Claim Number   Home Address  [address] Age  48 y.o. Height  1.727 m (5' 8\") Weight  92.9 kg (204 lb 12.8 oz) [facility]     [facility] Zip  [address] Telephone  [phone] (home)    Mailing Address  [address] [facility] [place]  [address] Primary Language Spoken  English    Insurer  [employer] Third Party   [employer]   Employee's Occupation (Job Title) When Injury or Occupational Disease Occurred      Employer's Name   [employer] Telephone  [phone]    Employer Address  [address]  [facility] [place] [facility]  Zip  [address]    Date of Injury  12/21/2018               Hour of Injury  8:45 AM Date Employer Notified  12/21/2018 Last Day of Work after Injury or Occupational Disease  12/21/2018 Supervisor to Whom Injury Reported  Saroj Rick   Address or Location of Accident (if applicable)  [[address]   What were you doing at the time of accident? (if applicable)  Cleaning up batteries and they started popping    How did this injury or occupational disease occur? (Be specific an answer in detail. Use additional sheet if necessary)  SAME-Cleaning up batteries and they started popping   If you believe that you have an occupational disease, when did you first have knowledge of the disability and it relationship to your employment?  n/a Witnesses to the Accident  2 to 3 others      Nature of Injury or Occupational Disease  Workers' Compensation  Part(s) of Body Injured or Affected  Lungs, Abdomen Including Groin,     I certify that the above is true and " correct to the best of my knowledge and that I have provided this information in order to obtain the benefits of [place]  I hereby authorize any physician, chiropractor, surgeon, practitioner, or other person, any hospital, including [facility] or [facility], any medical service organization, any insurance company, or other institution or organization to release to each other, any medical or other information, including benefits paid or payable, pertinent to this injury or disease, except information relative to diagnosis, treatment and/or counseling for AIDS, psychological conditions, alcohol or controlled substances, for which I must give specific authorization.  A Photostat of this authorization shall be as valid as the original.     Date 12/21/18   Place [facility]   Employee’s Signature   THIS REPORT MUST BE COMPLETED AND MAILED WITHIN 3 WORKING DAYS OF TREATMENT   Place  [facility]  Name of Facility  [facility]   Date  12/21/2018 Diagnosis  (Z77.098) Chemical exposure  (R11.0) Nausea  (R51) Acute nonintractable headache, unspecified headache type  (Z02.1) Pre-employment drug screening Is there evidence the injured employee was under the influence of alcohol and/or another controlled substance at the time of accident?   Hour  10:33 AM Description of Injury or Disease  Diagnoses of Chemical exposure, Nausea, Acute nonintractable headache, unspecified headache type, and Pre-employment drug screening were pertinent to this visit. No   Treatment  Discussed with poison control center. OTC nsaid as needed  Have you advised the patient to remain off work five days or more? Yes   X-Ray Findings    Comments:NA   If Yes   From Date12/21/2018 To Date  12/25/2018   From information given by the employee, together with medical evidence, can you directly connect this injury  "or occupational disease as job incurred?  Yes If No Full Duty  No Modified Duty      Is additional medical care by a physician indicated?  Yes If Modified Duty, Specify any Limitations / Restrictions      Do you know of any previous injury or disease contributing to this condition or occupational disease?                            Yes  Comments:Patient has had work associated headaches in the past.    Date  12/21/2018 Print Doctor’s Name Ahkil Sage M.D. I certify the employer’s copy of  this form was mailed on:   Address  [address] [facility] [address] Insurer’s Use Only     [facility] [place] [facility] Zip  [address]    Provider’s Tax ID Number  [id number] Telephone  Dept: [phone]        luzma-AKHIL Mac M.D.   e-Signature: Dr. Jaden Garcia, Medical Director Degree  MD        ORIGINAL-TREATING PHYSICIAN OR CHIROPRACTOR    PAGE 2-INSURER/TPA    PAGE 3-EMPLOYER    PAGE 4-EMPLOYEE             Form C-4 (rev10/07)              BRIEF DESCRIPTION OF RIGHTS AND BENEFITS  (Pursuant to [place]RS 616C.050)    Notice of Injury or Occupational Disease (Incident Report Form C-1): If an injury or occupational disease (OD) arises out of and in the  course of employment, you must provide written notice to your employer as soon as practicable, but no later than 7 days after the accident or  OD. Your employer shall maintain a sufficient supply of the required forms.    Claim for Compensation (Form C-4): If medical treatment is sought, the form C-4 is available at the place of initial treatment. A completed  \"Claim for Compensation\" (Form C-4) must be filed within 90 days after an accident or OD. The treating physician or chiropractor must,  within 3 working days after treatment, complete and mail to the employer, the employer's insurer and third-party , the Claim for  Compensation.    Medical Treatment: If you require medical treatment for your on-the-job injury or OD, you may be required to select a physician " or  chiropractor from a list provided by your workers’ compensation insurer, if it has contracted with an Organization for Managed Care (MCO) or  Preferred Provider Organization (PPO) or providers of health care. If your employer has not entered into a contract with an MCO or PPO, you  may select a physician or chiropractor from the Panel of Physicians and Chiropractors. Any medical costs related to your industrial injury or  OD will be paid by your insurer.    Temporary Total Disability (TTD): If your doctor has certified that you are unable to work for a period of at least 5 consecutive days, or 5  cumulative days in a 20-day period, or places restrictions on you that your employer does not accommodate, you may be entitled to TTD  compensation.    Temporary Partial Disability (TPD): If the wage you receive upon reemployment is less than the compensation for TTD to which you are  entitled, the insurer may be required to pay you TPD compensation to make up the difference. TPD can only be paid for a maximum of 24  months.    Permanent Partial Disability (PPD): When your medical condition is stable and there is an indication of a PPD as a result of your injury or  OD, within 30 days, your insurer must arrange for an evaluation by a rating physician or chiropractor to determine the degree of your PPD. The  amount of your PPD award depends on the date of injury, the results of the PPD evaluation and your age and wage.    Permanent Total Disability (PTD): If you are medically certified by a treating physician or chiropractor as permanently and totally disabled  and have been granted a PTD status by your insurer, you are entitled to receive monthly benefits not to exceed 66 2/3% of your average  monthly wage. The amount of your PTD payments is subject to reduction if you previously received a PPD award.    Vocational Rehabilitation Services: You may be eligible for vocational rehabilitation services if you are unable to  return to the job due to a  permanent physical impairment or permanent restrictions as a result of your injury or occupational disease.    Transportation and Per Jason Reimbursement: You may be eligible for travel expenses and per jason associated with medical treatment.    Reopening: You may be able to reopen your claim if your condition worsens after claim closure.    Appeal Process: If you disagree with a written determination issued by the insurer or the insurer does not respond to your request, you may  appeal to the Department of Administration, , by following the instructions contained in your determination letter. You must  appeal the determination within 70 days from the date of the determination letter at [address], [address], [place], [place]  [address], or [address], [address], [place], [place] [address]. If you disagree with the  decision, you may appeal to the  Department of Administration, . You must file your appeal within 30 days from the date of the  decision  letter at [address], [address], [place], [place] [address], or [address] [facility] [address], [place] [address]. If you  disagree with a decision of an , you may file a petition for judicial review with the District Court. You must do so within 30  days of the Appeal Officer’s decision. You may be represented by an  at your own expense or you may contact the [facility] for possible  representation.    [place]  for Injured Workers (NAIW): If you disagree with a  decision, you may request that NAIW represent you  without charge at an  Hearing. For information regarding denial of benefits, you may contact the [facility] at: [address], [address], [place] [address], [phone], or [address] [facility] [address], [place] [address], [phone]    To File a Complaint with the  Division: If you wish to file a complaint with the  of the Division of Industrial Relations (DIR),  please contact the Workers’ Compensation Section, [address], [address], [place], [place] [address], telephone [phone], or  [address], [address], [place], [place] [address], telephone [phone].    For assistance with Workers’ Compensation Issues: you may contact the Office of the Governor Consumer Health Assistance, [address] [place] [address], [address], [place], [place] [address], Toll Free 1-947.178.5285, Web site: http://ContextWeb.[facility].nv.us, E-mail  [email or web address].                                                                                                                                                                                                                                   __________________________________________________________________                                                                   ____12/21/18_____________                Employee Name / Signature                                                                                                                                                       Date                                                                                                                                                                                                     D-2 (rev. 10/07)

## 2018-12-25 ENCOUNTER — OCCUPATIONAL MEDICINE (OUTPATIENT)
Dept: URGENT CARE | Facility: PHYSICIAN GROUP | Age: 48
End: 2018-12-25
Payer: COMMERCIAL

## 2018-12-25 VITALS
OXYGEN SATURATION: 98 % | SYSTOLIC BLOOD PRESSURE: 160 MMHG | RESPIRATION RATE: 18 BRPM | DIASTOLIC BLOOD PRESSURE: 102 MMHG | TEMPERATURE: 97.9 F | WEIGHT: 204 LBS | HEART RATE: 91 BPM | BODY MASS INDEX: 30.92 KG/M2 | HEIGHT: 68 IN

## 2018-12-25 DIAGNOSIS — R51.9 ACUTE INTRACTABLE HEADACHE, UNSPECIFIED HEADACHE TYPE: ICD-10-CM

## 2018-12-25 DIAGNOSIS — Z77.098 CHEMICAL EXPOSURE: ICD-10-CM

## 2018-12-25 PROCEDURE — 99214 OFFICE O/P EST MOD 30 MIN: CPT | Performed by: FAMILY MEDICINE

## 2018-12-25 RX ORDER — KETOROLAC TROMETHAMINE 30 MG/ML
60 INJECTION, SOLUTION INTRAMUSCULAR; INTRAVENOUS ONCE
Status: COMPLETED | OUTPATIENT
Start: 2018-12-25 | End: 2018-12-25

## 2018-12-25 RX ADMIN — KETOROLAC TROMETHAMINE 60 MG: 30 INJECTION, SOLUTION INTRAMUSCULAR; INTRAVENOUS at 10:04

## 2018-12-25 ASSESSMENT — ENCOUNTER SYMPTOMS
FEVER: 0
SENSORY CHANGE: 0
DOUBLE VISION: 0
BLURRED VISION: 0
SORE THROAT: 0
MYALGIAS: 0
EYE DISCHARGE: 0
EYE REDNESS: 0
WEIGHT LOSS: 0
FOCAL WEAKNESS: 0

## 2018-12-25 NOTE — PROGRESS NOTES
"Subjective:      Jonn Arroyo is a 48 y.o. 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 Caps on batteries popped. He inhaled the fumes without mask. Approx 15min exposure time. No liquid exposure or ingestion. He has been exposed this in the past that has caused headache.  After this exposure he initially had a severe headache with associated nausea. Nausea has improved. HA persists at a level of 8-9/10. No muscle weakness. No vision change. +photophobia. +phonophobia     HPI    Review of Systems   Constitutional: Negative for fever, malaise/fatigue and weight loss.   HENT: Negative for sore throat.    Eyes: Negative for blurred vision, double vision, discharge and redness.   Musculoskeletal: Negative for joint pain and myalgias.   Skin: Negative for itching and rash.   Neurological: Negative for sensory change and focal weakness.     .  Medications, Allergies, and current problem list reviewed today in Epic       Objective:     /102   Pulse 91   Temp 36.6 °C (97.9 °F) (Temporal)   Resp 18   Ht 1.727 m (5' 8\")   Wt 92.5 kg (204 lb)   SpO2 98%   BMI 31.02 kg/m²      Physical Exam   Constitutional: He is oriented to person, place, and time. He appears well-developed and well-nourished. No distress.   HENT:   Mouth/Throat: Oropharynx is clear and moist.   Eyes: Conjunctivae are normal.   Neck: Normal range of motion. Neck supple.   Cardiovascular: Normal rate, regular rhythm and normal heart sounds.    No murmur heard.  Pulmonary/Chest: Effort normal.   Neurological: He is alert and oriented to person, place, and time. No cranial nerve deficit. Coordination normal.   Skin: Skin is warm and dry. No rash noted.       HEENT: Atraumatic normocephalic.  PERRL.  EOMI  Neuro: Gait stable.  No focal deficits.  Alert and oriented.       Assessment/Plan:     1. Chemical exposure    - REFERRAL TO NEUROLOGY    2. Acute " intractable headache, unspecified headache type    - REFERRAL TO NEUROLOGY  - ketorolac (TORADOL) injection 60 mg; 2 mL by Intramuscular route Once.    I have discussed this case with Dr. Abreu.  At this point will transfer care to neurology for further evaluation of headache, etiology, and determination if exposure is causal to the headaches.  Previous history of headaches noted as well as negative MRI.

## 2018-12-25 NOTE — LETTER
[facility]  [address]  [place] [address]  Phone:  [phone] - Fax:  [phone]   [facility] Progress Report and Disability Certification  Date of Service: 12/25/2018   No Show:  No  Date / Time of Next Visit:  transfer care to neurology   Claim Information   Patient Name: Jonn Arroyo  Claim Number:     Employer: [employer] ONLY  Date of Injury: 12/21/2018     Insurer / TPA: [employer]  ID / SSN:     Occupation:   Diagnosis: Diagnoses of Chemical exposure and Acute intractable headache, unspecified headache type were pertinent to this visit.    Medical Information   Related to Industrial Injury? 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 Caps on batteries popped. He inhaled the fumes without mask. Approx 15min exposure time. No liquid exposure or ingestion. He has been exposed this in the past that has caused headache.  After this exposure he initially had a severe headache with associated nausea. Nausea has improved. HA persists at a level of 8-9/10. No muscle weakness. No vision change. +photophobia. +phonophobia   Objective Findings: HEENT: Atraumatic normocephalic.  PERRL.  EOMI  Neuro: Gait stable.  No focal deficits.  Alert and oriented.   Pre-Existing Condition(s): Previous headaches patient suspects associated with exposure at work   Assessment:   Condition Improved    Status:    Permanent Disability:No    Plan:   Comments:toradol IM in clinic, transfer care to neurology    Diagnostics:      Comments:       Disability Information   Status: Temporarily Totally Disabled    From:     Through:  until cleared by neurology Restrictions are:     Physical Restrictions   Sitting:    Standing:    Stooping:    Bending:      Squatting:    Walking:    Climbing:    Pushing:      Pulling:    Other:    Reaching Above Shoulder (L):    Reaching Above Shoulder (R):       Reaching Below Shoulder (L):    Reaching Below Shoulder (R):      Not to exceed Weight Limits   Carrying(hrs):   Weight Limit(lb):   Lifting(hrs):   Weight  Limit(lb):     Comments:      Repetitive Actions   Hands: i.e. Fine Manipulations from Grasping:     Feet: i.e. Operating Foot Controls:     Driving / Operate Machinery:     Physician Name: Akhil Sage M.D. Physician Signature: AKHIL Gonzales M.D. e-Signature: Dr. Jaden Garcia, Medical Director   Clinic Name / Location: [address] [address] Clinic Phone Number: Dept: [phone]   Appointment Time: 9:20 Am Visit Start Time: 9:10 AM   Check-In Time:  9:05 Am Visit Discharge Time:  10:15 AM    Original-Treating Physician or Chiropractor    Page 2-Insurer/TPA    Page 3-Employer    Page 4-Employee

## 2019-02-19 ENCOUNTER — OFFICE VISIT (OUTPATIENT)
Dept: NEUROLOGY | Facility: MEDICAL CENTER | Age: 49
End: 2019-02-19
Payer: COMMERCIAL

## 2019-02-19 VITALS
OXYGEN SATURATION: 96 % | SYSTOLIC BLOOD PRESSURE: 138 MMHG | HEART RATE: 73 BPM | HEIGHT: 68 IN | BODY MASS INDEX: 30.31 KG/M2 | TEMPERATURE: 97.4 F | WEIGHT: 200 LBS | DIASTOLIC BLOOD PRESSURE: 78 MMHG

## 2019-02-19 DIAGNOSIS — Z87.442 PERSONAL HISTORY OF KIDNEY STONES: ICD-10-CM

## 2019-02-19 DIAGNOSIS — G43.719 CHRONIC MIGRAINE WITHOUT AURA, INTRACTABLE, WITHOUT STATUS MIGRAINOSUS: ICD-10-CM

## 2019-02-19 DIAGNOSIS — E66.9 OBESITY (BMI 30.0-34.9): ICD-10-CM

## 2019-02-19 DIAGNOSIS — Z87.898 H/O CHEMICAL EXPOSURE: ICD-10-CM

## 2019-02-19 PROCEDURE — 99204 OFFICE O/P NEW MOD 45 MIN: CPT | Performed by: PSYCHIATRY & NEUROLOGY

## 2019-02-19 RX ORDER — COVID-19 ANTIGEN TEST
KIT MISCELLANEOUS
COMMUNITY

## 2019-02-19 RX ORDER — ACETAMINOPHEN 325 MG/1
650 TABLET ORAL EVERY 4 HOURS PRN
COMMUNITY

## 2019-02-19 ASSESSMENT — ENCOUNTER SYMPTOMS
FEVER: 0
SHORTNESS OF BREATH: 0
SORE THROAT: 0
FALLS: 0
WEIGHT LOSS: 0
HALLUCINATIONS: 0
ABDOMINAL PAIN: 0
EYE DISCHARGE: 0
BRUISES/BLEEDS EASILY: 0

## 2019-02-19 ASSESSMENT — PATIENT HEALTH QUESTIONNAIRE - PHQ9: CLINICAL INTERPRETATION OF PHQ2 SCORE: 0

## 2019-02-19 NOTE — LETTER
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 I would like him to continue to follow up with me in clinic. If his clinical condition changes, then he will need to be reevaluated.     Sincerely,      Dyana Mathis MD  Neurology - Neurophysiology  [facility]  Office: [phone]

## 2019-02-19 NOTE — PROGRESS NOTES
Chief Complaint   Patient presents with   • Follow-Up     Patient is referred by Dr. Akhil Sage for initial consult.    History of present illness:  Jonn Arroyo 48 y.o. male presents today for headaches post chemical exposure.   History is obtained from patient.  and Patient is accompanied by self.    Duration/timin2018, suspect prior exposures (see below)  Context: Headache after exposure to [employer] battery lithium based electrolyte after Batteries Popped.  Reportedly Inhaled Fumes without a Mask for Approximately 15 Minutes. Almost immediately after he felt nausea/dizzy/worsening headache/not feeling himself/cognitive fog, taken to [facility]. Headaches are waxing and waning 7-8 pain, constant. Reports prior exposure in the past resulting in headache. He works for [employer] - trying to get another job as . He used to work in assembly line building battery worked for 1.5 years - he was developing headaches 3 months after starting job. Prior headaches (frontal, stabbing pains holocephalic,  improved after he was removed from assembly line and use of respirator, severity 7 constant daily for 8 months - 1 year; after removal around -2018 he improved severity to dull pain that was constant low level of pain)    Location: brain, holocephalic  Quality: pulsating  Severity: 7-8  Modifying factors: worse with lights/noise, time  Associated signs/symptoms: Nausea improved, dizziness persistent but improved, photophobia, phonophobia, worse with activity   Denies: bladder incontinence, bowel incontinence, vision changes, head trauma , weakness, numbness/tingling, swallowing difficulties, speech disturbance, incoordination, loss of consciousness, hallucinations, diplopia, aura, autonomic symptoms and snoring, ingestion, liquid exposure, pulmonary symptoms, wearing a mask in 2018, personal hx of headaches, seizures, AMS    He has an eight year old. He is on temporary disability.  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     Patient has tried:  -Tylenol, naproxen, ibuprofen - taking almost every day 12-1/2019; February 7-10/19 days so far  -Toradol shot     Past medical history:   Past Medical History:   Diagnosis Date   • History of chemical exposure    • Migraine        Past surgical history:   Past Surgical History:   Procedure Laterality Date   • OTHER Bilateral     LASIK eye surgery       Family history:   Family History   Problem Relation Age of Onset   • Cancer Father         thyroid   • Other Father         migraine       Social history:   Social History   • Marital status:      Spouse name: N/A   • Number of children: 1     Social History Main Topics   • Smoking status: Former Smoker     Packs/day: 0.50     Years: 6.00     Quit date: 4/27/2009   • Smokeless tobacco: Former User   • Alcohol use No   • Drug use: No      Comment: rarely smoked marijuana when visited his brothera while ago.   • Sexual activity: Yes     Partners: Female     Current medications:   Current Outpatient Prescriptions   Medication   • acetaminophen (TYLENOL) 325 MG Tab   • Naproxen Sodium (ALEVE) 220 MG Cap   • ibuprofen (MOTRIN) 600 MG Tab     No current facility-administered medications for this visit.        Medication Allergy:  No Known Allergies    Review of Systems   Constitutional: Negative for fever and weight loss.   HENT: Negative for sore throat.    Eyes: Negative for discharge.   Respiratory: Negative for shortness of breath.    Cardiovascular: Negative for leg swelling.   Gastrointestinal: Negative for abdominal pain.   Genitourinary: Negative for dysuria.   Musculoskeletal: Negative for falls.   Skin: Negative for rash.   Neurological:        As per HPI   Endo/Heme/Allergies: Does not bruise/bleed easily.   Psychiatric/Behavioral: Negative for hallucinations.       Physical examination:   Vitals:    02/19/19 0940   BP: 138/78   BP Location: Left arm   Patient Position: Sitting   Pulse:  "73   Temp: 36.3 °C (97.4 °F)   TempSrc: Temporal   SpO2: 96%   Weight: 90.7 kg (200 lb)   Height: 1.727 m (5' 8\")     General: Patient in well nourished in no apparent distress.  Eyes: Ophthalmoscopic examination of the optic discs and posterior elements reveals sharp disk margins, normal vessels and pigmentation bilaterally.  HENT: Normocephalic, atraumatic. Mallapatic score 1-2  Cardiovascular: No lower extremity edema.  Respiratory: Normal respiratory effort.   Skin: No appreciable signs of acute rashes or bruising.   Musculoskeletal: No signs of joint or muscle swelling.   Psychiatric: Pleasant.  Normal affect    NEUROLOGICAL EXAM:   Mental status: Awake, alert and fully oriented to person, place, time and situation. Normal attention, concentration and fund of knowledge for education level.   Speech and language: Speech is fluent without errors, intact to repetition, intact to reading and intact to naming.  Cranial nerve exam:  II: Pupils are equally round and reactive to light. Visual fields are intact by confrontation.  III, IV, VI: EOMI, no diplopia, no ptosis.  V: Sensation to light touch is normal over V1-3 distributions bilaterally.  .  VII: Facial movements are symmetrical. There is no facial droop. .  VIII: Hearing intact to soft speech and finger rub bilaterally  IX: Palate elevates symmetrically, uvula is midline. Dysarthria is not present.  XI: Shoulder shrug are symmetrical and strong.   XII: Tongue protrudes midline.    Motor exam:  Muscle tone is normal in all 4 limbs. and No abnormal movements appreciated.    Muscle strength:     Right  Left  Deltoid   5/5  5/5      Biceps   5/5  5/5  Triceps  5/5  5/5   Wrist extensors 5/5  5/5  Wrist flexors  5/5  5/5     5/5  5/5  Interossei  5/5  5/5  Thenar (APB)  NT/5  NT/5   Hip flexors  5/5  5/5  Quadriceps  5/5  5/5    Hamstrings  5/5  5/5  Dorsiflexors  5/5  5/5  Plantarflexors  5/5  5/5  Toe extension  5/5  5/5  NT = not tested    Sensory " exam:  Intact to Light touch, Temperature and Vibration in bilateral upper and lower extremity.    Deep tendon reflexes:       Right  Left  Biceps   3/4  3/4  Triceps  3/4  3/4  Brachioradialis 3/4  3/4  Knee jerk  3/4  3/4  Ankle jerk  3/4  3/4   bilateral toes are downgoing to plantar stimulation..    Coordination: shows a normal finger-nose-finger and heel to shin bilaterally.   Gait: Casual gait is normal., Heel walk is normal., Toe walk is normal. and Tandem gait is normal.      ANCILLARY DATA REVIEWED:   Lab Data Review:  Lab Results   Component Value Date/Time    WBC 5.7 05/17/2018 09:14 AM    RBC 5.50 05/17/2018 09:14 AM    HEMOGLOBIN 15.9 05/17/2018 09:14 AM    HEMATOCRIT 48.6 05/17/2018 09:14 AM    MCV 88.4 05/17/2018 09:14 AM    MCH 28.9 05/17/2018 09:14 AM    MCHC 32.7 (L) 05/17/2018 09:14 AM    MPV 9.5 05/17/2018 09:14 AM    NEUTSPOLYS 53.40 05/17/2018 09:14 AM    LYMPHOCYTES 31.50 05/17/2018 09:14 AM    MONOCYTES 12.60 05/17/2018 09:14 AM    EOSINOPHILS 1.80 05/17/2018 09:14 AM    BASOPHILS 0.50 05/17/2018 09:14 AM      Lab Results   Component Value Date/Time    SODIUM 137 05/17/2018 09:14 AM    POTASSIUM 4.1 05/17/2018 09:14 AM    CHLORIDE 104 05/17/2018 09:14 AM    CO2 26 05/17/2018 09:14 AM    GLUCOSE 84 05/17/2018 09:14 AM    BUN 14 05/17/2018 09:14 AM    CREATININE 0.79 05/17/2018 09:14 AM       Lab Results  Component Value Date/Time   ASTSGOT 23 05/17/2018 0914   ALTSGPT 22 05/17/2018 0914   ALKPHOSPHAT 48 05/17/2018 0914   ALBUMIN 4.6 05/17/2018 0914       Imaging: MRI brain with and without contrast dated May 2018 performed for evaluation of headaches: MRI of the brain without and with contrast within normal limits.    I have personally reviewed the patient's imaging as above and agree with the radiologist's interpretation.     Records reviewed: Patient was seen by [facility] medicine occupational health in December 2018.  He was given Zofran and ibuprofen for acute non-intractable headache.  Case  "was discussed with Poison Control Center-if there was no coughing or choking that inhalation exposure is unlikely to cause systemic symptoms most concerning of which is paralysis.  Patient was discharged and followed up in clinic.    ASSESSMENT AND PLAN:    1. Chronic migraine without aura, intractable, without status migrainosus: With quality of new daily persistent headache as well.  Based on timing can be secondary to chemical exposure versus chemical exposure being the trigger.  He does have a family history of migraine in his father triggered by caffeine.  Prior MRI brain was unremarkable in May 2018.  Repeat imaging is helpful given his MOCA today -differential includes poor performance due to severe migraine.  Patient declines any preventative medications including over-the-counter medications due to concerns of side effects.   - MR-BRAIN-W/O; Future  -Continue as needed ibuprofen for severe headaches, use sparingly as discussed  -Letter provided for patient's new job.    2. H/O chemical exposure: Evaluate for white matter changes that may result from toxin inhalation; I do not see any immediate neurologic concerns based on my clinical evaluation today that would compromise his ability to drive or drive other people.  I cannot comment on patient's \"overall well-being\" as requested as there may be underlying pulmonary or cardiac etc. complications that I cannot evaluate. If his clinical condition changes, he will need reevaluation.   - MR-BRAIN-W/O; Future  -If patient complains of persistent memory loss consider TSH and B12, RPR, HIV depending on risk factors    3. Obesity (BMI 30.0-34.9): Chronic, will discuss weight loss when patient is ready    4. Personal history of kidney stones: Avoid Topamax    FOLLOW-UP: Return in about 3 months (around 5/19/2019).  To evaluate radius for medications as well as cognitive function.  EDUCATION AND COUNSELING:  -I personally discussed the following with the patient: "   Risks/benefits/side effects/alternatives of medication including but not limited to drowsiness, sedation, dizziness, increased risk for falls, cardiovascular effects (hypotension, cardiac arrhythmias, death), weight changes, GI side effects (gastritis, ulcers, bleeding, changes in appetite, pancreatitis, change in bowel habits), renal dysfunction, kidney stones, glaucoma and increased risk for depression, anxiety, suicide, psychosis and mood changes., Diagnosis, prognosis, and treatment options discussed with patient at length.  , Discussed healthy lifestyle, including: healthy diet (rich in fruits, vegetables, nuts and healthy oils); proper hydration, and adequate sleep hygiene (allowing 7-8 hrs of overnight sleep)., Recommend regular exercise, proper hydration, healthy diet and stress reduction. , Recommend improvement of sleep hygiene, including a structured schedule. Allow 7-8 hrs of overnight sleep. Avoid daytime naps.  and I have made the patient aware of mandatory reporting required by the law in the [facility] regarding episodes of seizures, loss of consciousness, alteration of awareness, and/or concerns for driving safety as discussed today. The patient and family are responsible for reporting events to the DMV, instructions were provided. The patient verbalized understanding.     The patient understands and agrees that due to the complexity of his/her diagnosis, results of any testing and further recommendations will typically be discussed/made during a face to face encounter in my office. The patient and/or family further understands it is their responsibility to keep proper follow up.     Disclaimer  This dictation was created using voice recognition software. I have made every reasonable attempt to avoid dictation errors, but this document may contain an error not identified before finalizing. If the error changes the accuracy of the document, I would appreciate it being brought to my attention.  Thank you very much.     Dyana Mathis MD  Neurology, Neurophysiology  [facility]    Addendum:   MSDS for [employer] lithium battery reviewed. Brought in by patient.

## 2019-02-19 NOTE — LETTER
[facility]   [address], [address]  [place] [address]  Phone: [phone]  Fax: [phone]              Jonn Arroyo  1970    Encounter Date: 2019    Dyana Mathis M.D.          PROGRESS NOTE:  Chief Complaint   Patient presents with   • Follow-Up     Patient is referred by Dr. Akhil Sage for initial consult.    History of present illness:  Jonn Arroyo 48 y.o. male presents today for headaches post chemical exposure.   History is obtained from patient.  and Patient is accompanied by self.    Duration/timin2018, suspect prior exposures (see below)  Context: Headache after exposure to [employer] battery lithium based electrolyte after Batteries Popped.  Reportedly Inhaled Fumes without a Mask for Approximately 15 Minutes. Almost immediately after he felt nausea/dizzy/worsening headache/not feeling himself/cognitive fog, taken to [facility]. Headaches are waxing and waning 7-8 pain, constant. Reports prior exposure in the past resulting in headache. He works for [employer] - trying to get another job as . He used to work in assembly line building battery worked for 1.5 years - he was developing headaches 3 months after starting job. Prior headaches (frontal, stabbing pains holocephalic,  improved after he was removed from assembly line and use of respirator, severity 7 constant daily for 8 months - 1 year; after removal around  he improved severity to dull pain that was constant low level of pain)    Location: brain, holocephalic  Quality: pulsating  Severity: 7-8  Modifying factors: worse with lights/noise, time  Associated signs/symptoms: Nausea improved, dizziness persistent but improved, photophobia, phonophobia, worse with activity   Denies: bladder incontinence, bowel incontinence, vision changes, head trauma , weakness, numbness/tingling, swallowing difficulties, speech disturbance, incoordination, loss of consciousness,  hallucinations, diplopia, aura, autonomic symptoms and snoring, ingestion, liquid exposure, pulmonary symptoms, wearing a mask in 12/2018, personal hx of headaches, seizures, AMS    He has an eight year old. He is on temporary disability.  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     Patient has tried:  -Tylenol, naproxen, ibuprofen - taking almost every day 12-1/2019; February 7-10/19 days so far  -Toradol shot     Past medical history:   Past Medical History:   Diagnosis Date   • History of chemical exposure    • Migraine        Past surgical history:   Past Surgical History:   Procedure Laterality Date   • OTHER Bilateral     LASIK eye surgery       Family history:   Family History   Problem Relation Age of Onset   • Cancer Father         thyroid   • Other Father         migraine       Social history:   Social History   • Marital status:      Spouse name: N/A   • Number of children: 1     Social History Main Topics   • Smoking status: Former Smoker     Packs/day: 0.50     Years: 6.00     Quit date: 4/27/2009   • Smokeless tobacco: Former User   • Alcohol use No   • Drug use: No      Comment: rarely smoked marijuana when visited his brothera while ago.   • Sexual activity: Yes     Partners: Female     Current medications:   Current Outpatient Prescriptions   Medication   • acetaminophen (TYLENOL) 325 MG Tab   • Naproxen Sodium (ALEVE) 220 MG Cap   • ibuprofen (MOTRIN) 600 MG Tab     No current facility-administered medications for this visit.        Medication Allergy:  No Known Allergies    Review of Systems   Constitutional: Negative for fever and weight loss.   HENT: Negative for sore throat.    Eyes: Negative for discharge.   Respiratory: Negative for shortness of breath.    Cardiovascular: Negative for leg swelling.   Gastrointestinal: Negative for abdominal pain.   Genitourinary: Negative for dysuria.   Musculoskeletal: Negative for falls.   Skin: Negative for rash.   Neurological:     "    As per HPI   Endo/Heme/Allergies: Does not bruise/bleed easily.   Psychiatric/Behavioral: Negative for hallucinations.       Physical examination:   Vitals:    02/19/19 0940   BP: 138/78   BP Location: Left arm   Patient Position: Sitting   Pulse: 73   Temp: 36.3 °C (97.4 °F)   TempSrc: Temporal   SpO2: 96%   Weight: 90.7 kg (200 lb)   Height: 1.727 m (5' 8\")     General: Patient in well nourished in no apparent distress.  Eyes: Ophthalmoscopic examination of the optic discs and posterior elements reveals sharp disk margins, normal vessels and pigmentation bilaterally.  HENT: Normocephalic, atraumatic. Mallapatic score 1-2  Cardiovascular: No lower extremity edema.  Respiratory: Normal respiratory effort.   Skin: No appreciable signs of acute rashes or bruising.   Musculoskeletal: No signs of joint or muscle swelling.   Psychiatric: Pleasant.  Normal affect    NEUROLOGICAL EXAM:   Mental status: Awake, alert and fully oriented to person, place, time and situation. Normal attention, concentration and fund of knowledge for education level.   Speech and language: Speech is fluent without errors, intact to repetition, intact to reading and intact to naming.  Cranial nerve exam:  II: Pupils are equally round and reactive to light. Visual fields are intact by confrontation.  III, IV, VI: EOMI, no diplopia, no ptosis.  V: Sensation to light touch is normal over V1-3 distributions bilaterally.  .  VII: Facial movements are symmetrical. There is no facial droop. .  VIII: Hearing intact to soft speech and finger rub bilaterally  IX: Palate elevates symmetrically, uvula is midline. Dysarthria is not present.  XI: Shoulder shrug are symmetrical and strong.   XII: Tongue protrudes midline.    Motor exam:  Muscle tone is normal in all 4 limbs. and No abnormal movements appreciated.    Muscle strength:     Right  Left  Deltoid   5/5  5/5      Biceps   5/5  5/5  Triceps  5/5  5/5   Wrist extensors 5/5  5/5  Wrist " flexors  5/5  5/5     5/5  5/5  Interossei  5/5  5/5  Thenar (APB)  NT/5  NT/5   Hip flexors  5/5  5/5  Quadriceps  5/5  5/5    Hamstrings  5/5  5/5  Dorsiflexors  5/5  5/5  Plantarflexors  5/5  5/5  Toe extension  5/5  5/5  NT = not tested    Sensory exam:  Intact to Light touch, Temperature and Vibration in bilateral upper and lower extremity.    Deep tendon reflexes:       Right  Left  Biceps   3/4  3/4  Triceps  3/4  3/4  Brachioradialis 3/4  3/4  Knee jerk  3/4  3/4  Ankle jerk  3/4  3/4   bilateral toes are downgoing to plantar stimulation..    Coordination: shows a normal finger-nose-finger and heel to shin bilaterally.   Gait: Casual gait is normal., Heel walk is normal., Toe walk is normal. and Tandem gait is normal.      ANCILLARY DATA REVIEWED:   Lab Data Review:  Lab Results   Component Value Date/Time    WBC 5.7 05/17/2018 09:14 AM    RBC 5.50 05/17/2018 09:14 AM    HEMOGLOBIN 15.9 05/17/2018 09:14 AM    HEMATOCRIT 48.6 05/17/2018 09:14 AM    MCV 88.4 05/17/2018 09:14 AM    MCH 28.9 05/17/2018 09:14 AM    MCHC 32.7 (L) 05/17/2018 09:14 AM    MPV 9.5 05/17/2018 09:14 AM    NEUTSPOLYS 53.40 05/17/2018 09:14 AM    LYMPHOCYTES 31.50 05/17/2018 09:14 AM    MONOCYTES 12.60 05/17/2018 09:14 AM    EOSINOPHILS 1.80 05/17/2018 09:14 AM    BASOPHILS 0.50 05/17/2018 09:14 AM      Lab Results   Component Value Date/Time    SODIUM 137 05/17/2018 09:14 AM    POTASSIUM 4.1 05/17/2018 09:14 AM    CHLORIDE 104 05/17/2018 09:14 AM    CO2 26 05/17/2018 09:14 AM    GLUCOSE 84 05/17/2018 09:14 AM    BUN 14 05/17/2018 09:14 AM    CREATININE 0.79 05/17/2018 09:14 AM       Lab Results  Component Value Date/Time   ASTSGOT 23 05/17/2018 0914   ALTSGPT 22 05/17/2018 0914   ALKPHOSPHAT 48 05/17/2018 0914   ALBUMIN 4.6 05/17/2018 0914       Imaging: MRI brain with and without contrast dated May 2018 performed for evaluation of headaches: MRI of the brain without and with contrast within normal limits.  Records reviewed: Patient  "was seen by [facility] occupational health in December 2018.  He was given Zofran and ibuprofen for acute non-intractable headache.  Case was discussed with Poison Control Center-if there was no coughing or choking that inhalation exposure is unlikely to cause systemic symptoms most concerning of which is paralysis.  Patient was discharged and followed up in clinic.    ASSESSMENT AND PLAN:    1. Chronic migraine without aura, intractable, without status migrainosus: With quality of new daily persistent headache as well.  Based on timing can be secondary to chemical exposure versus chemical exposure being the trigger.  He does have a family history of migraine in his father triggered by caffeine.  Prior MRI brain was unremarkable in May 2018.  Repeat imaging is helpful given his MOCA today -differential includes poor performance due to severe migraine.  Patient declines any preventative medications including over-the-counter medications due to concerns of side effects.   - MR-BRAIN-W/O; Future  -Continue as needed ibuprofen for severe headaches, use sparingly as discussed  -Letter provided for patient's new job.    2. H/O chemical exposure: Evaluate for white matter changes that may result from toxin inhalation; I do not see any immediate neurologic concerns based on my clinical evaluation today that would compromise his ability to drive or drive other people.  I cannot comment on patient's \"overall well-being\" as requested as there may be underlying pulmonary or cardiac etc. complications that I cannot evaluate. If his clinical condition changes, he will need reevaluation.   - MR-BRAIN-W/O; Future  -If patient complains of persistent memory loss consider TSH and B12, RPR, HIV depending on risk factors    3. Obesity (BMI 30.0-34.9): Chronic, will discuss weight loss when patient is ready    4. Personal history of kidney stones: Avoid Topamax    FOLLOW-UP: Return in about 3 months (around 5/19/2019).  To evaluate " radius for medications as well as cognitive function.  EDUCATION AND COUNSELING:  -I personally discussed the following with the patient:   Risks/benefits/side effects/alternatives of medication including but not limited to drowsiness, sedation, dizziness, increased risk for falls, cardiovascular effects (hypotension, cardiac arrhythmias, death), weight changes, GI side effects (gastritis, ulcers, bleeding, changes in appetite, pancreatitis, change in bowel habits), renal dysfunction, kidney stones, glaucoma and increased risk for depression, anxiety, suicide, psychosis and mood changes., Diagnosis, prognosis, and treatment options discussed with patient at length.  , Discussed healthy lifestyle, including: healthy diet (rich in fruits, vegetables, nuts and healthy oils); proper hydration, and adequate sleep hygiene (allowing 7-8 hrs of overnight sleep)., Recommend regular exercise, proper hydration, healthy diet and stress reduction. , Recommend improvement of sleep hygiene, including a structured schedule. Allow 7-8 hrs of overnight sleep. Avoid daytime naps.  and I have made the patient aware of mandatory reporting required by the law in the [place] regarding episodes of seizures, loss of consciousness, alteration of awareness, and/or concerns for driving safety as discussed today. The patient and family are responsible for reporting events to the DMV, instructions were provided. The patient verbalized understanding.     The patient understands and agrees that due to the complexity of his/her diagnosis, results of any testing and further recommendations will typically be discussed/made during a face to face encounter in my office. The patient and/or family further understands it is their responsibility to keep proper follow up.     Disclaimer  This dictation was created using voice recognition software. I have made every reasonable attempt to avoid dictation errors, but this document may contain an error  not identified before finalizing. If the error changes the accuracy of the document, I would appreciate it being brought to my attention. Thank you very much.     Dyana Mathis MD  Neurology, Neurophysiology  [facility]        No Recipients

## 2019-03-06 ENCOUNTER — HOSPITAL ENCOUNTER (OUTPATIENT)
Dept: RADIOLOGY | Facility: MEDICAL CENTER | Age: 49
End: 2019-03-06
Attending: PSYCHIATRY & NEUROLOGY
Payer: COMMERCIAL

## 2019-03-06 DIAGNOSIS — G43.719 CHRONIC MIGRAINE WITHOUT AURA, INTRACTABLE, WITHOUT STATUS MIGRAINOSUS: ICD-10-CM

## 2019-03-06 DIAGNOSIS — Z87.898 H/O CHEMICAL EXPOSURE: ICD-10-CM

## 2019-03-06 PROCEDURE — 70551 MRI BRAIN STEM W/O DYE: CPT

## 2019-05-20 ENCOUNTER — OFFICE VISIT (OUTPATIENT)
Dept: NEUROLOGY | Facility: MEDICAL CENTER | Age: 49
End: 2019-05-20
Payer: COMMERCIAL

## 2019-05-20 VITALS
SYSTOLIC BLOOD PRESSURE: 130 MMHG | HEART RATE: 100 BPM | WEIGHT: 204 LBS | RESPIRATION RATE: 16 BRPM | HEIGHT: 68 IN | BODY MASS INDEX: 30.92 KG/M2 | DIASTOLIC BLOOD PRESSURE: 70 MMHG | TEMPERATURE: 97.7 F | OXYGEN SATURATION: 95 %

## 2019-05-20 DIAGNOSIS — G89.29 CHRONIC NONINTRACTABLE HEADACHE, UNSPECIFIED HEADACHE TYPE: ICD-10-CM

## 2019-05-20 DIAGNOSIS — Z87.898 H/O CHEMICAL EXPOSURE: ICD-10-CM

## 2019-05-20 DIAGNOSIS — R51.9 CHRONIC NONINTRACTABLE HEADACHE, UNSPECIFIED HEADACHE TYPE: ICD-10-CM

## 2019-05-20 PROCEDURE — 99213 OFFICE O/P EST LOW 20 MIN: CPT | Performed by: PSYCHIATRY & NEUROLOGY

## 2019-05-20 ASSESSMENT — ENCOUNTER SYMPTOMS
FALLS: 0
SENSORY CHANGE: 0
WEIGHT LOSS: 0
FEVER: 0
SHORTNESS OF BREATH: 0
HALLUCINATIONS: 0
DIZZINESS: 0
NAUSEA: 0

## 2019-05-20 NOTE — PROGRESS NOTES
Chief Complaint   Patient presents with   • Follow-Up     Chronic migraine     History of present illness:  Jonn Arroyo 48 y.o. male presents today for headaches post chemical exposure follow-up.   History is obtained from patient.  and Patient is accompanied by self.    Duration/timin2018, suspect prior exposures (see below)  Context: Headache after exposure to [employer] battery lithium based electrolyte after Batteries Popped.  Reportedly Inhaled Fumes without a Mask for Approximately 15 Minutes. Almost immediately after he felt nausea/dizzy/worsening headache/not feeling himself/cognitive fog, taken to [facility]. Headaches are waxing and waning 7-8 pain, constant. Reports prior exposure in the past resulting in headache. He works for [employer] - trying to get another job as . He used to work in assembly line building battery worked for 1.5 years - he was developing headaches 3 months after starting job. Prior headaches (frontal, stabbing pains holocephalic,  improved after he was removed from assembly line and use of respirator, severity 7 constant daily for 8 months - 1 year; after removal around  he improved severity to dull pain that was constant low level of pain)    Location: brain, holocephalic  Quality: pulsating  Severity: 7-8  Modifying factors: worse with lights/noise, time  Associated signs/symptoms: Nausea improved, dizziness persistent but improved, photophobia, phonophobia, worse with activity   Denies: bladder incontinence, bowel incontinence, vision changes, head trauma , weakness, numbness/tingling, swallowing difficulties, speech disturbance, incoordination, loss of consciousness, hallucinations, diplopia, aura, autonomic symptoms and snoring, ingestion, liquid exposure, pulmonary symptoms, wearing a mask in 2018, personal hx of headaches, seizures, AMS    He has an eight year old. He is on temporary disability.  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     Patient has tried:  -Tylenol, naproxen, ibuprofen - taking almost every day 12-1/2019; February 7-10/19 days so far, April 6 or less days a month  -Toradol shot    Subjective: Patient was last seen in neurology clinic on February 2019 by me. Since last visit his headaches are no longer constant. He has 5-6/30 headache days a month, mild instead of severe. He is back to work driving without issue.  He is completing IADLs and ADLs without issue.  He has rare nosebleeds at this point, without pain.  He is enjoying work and has not had any issues with his cognition.  He is happy he is getting better.     Past medical history:   Past Medical History:   Diagnosis Date   • History of chemical exposure    • Kidney stone    • Migraine        Past surgical history:   Past Surgical History:   Procedure Laterality Date   • OTHER Bilateral     LASIK eye surgery       Family history:   Family History   Problem Relation Age of Onset   • Cancer Father         thyroid   • Other Father         migraine       Social history:   Social History   • Marital status:      Spouse name: N/A   • Number of children: 1     Social History Main Topics   • Smoking status: Former Smoker     Packs/day: 0.50     Years: 6.00     Quit date: 4/27/2009   • Smokeless tobacco: Former User   • Alcohol use No   • Drug use: No      Comment: rarely smoked marijuana when visited his brothera while ago.   • Sexual activity: Yes     Partners: Female     Current medications:   Current Outpatient Prescriptions   Medication   • acetaminophen (TYLENOL) 325 MG Tab   • Naproxen Sodium (ALEVE) 220 MG Cap   • ibuprofen (MOTRIN) 600 MG Tab     No current facility-administered medications for this visit.        Medication Allergy:  No Known Allergies    Review of Systems   Constitutional: Negative for fever and weight loss.   HENT: Positive for nosebleeds.    Respiratory: Negative for shortness of breath.    Gastrointestinal: Negative for nausea.  "  Musculoskeletal: Negative for falls.   Skin: Negative for rash.   Neurological: Negative for dizziness and sensory change.        As per HPI   Psychiatric/Behavioral: Negative for hallucinations.       Physical examination:   Vitals:    05/20/19 1104   BP: 130/70   BP Location: Left arm   Patient Position: Sitting   BP Cuff Size: Adult   Pulse: 100   Resp: 16   Temp: 36.5 °C (97.7 °F)   TempSrc: Temporal   SpO2: 95%   Weight: 92.5 kg (204 lb)   Height: 1.727 m (5' 8\")     General: Patient in well nourished in no apparent distress.  HENT: Normocephalic, atraumatic. Mallapatic score 1-2  Cardiovascular: No lower extremity edema.  Respiratory: Normal respiratory effort.   Skin: No appreciable signs of acute rashes or bruising.   Musculoskeletal: No signs of joint or muscle swelling.   Psychiatric: Pleasant.  Normal affect    NEUROLOGICAL EXAM:   Mental status: Awake, alert and fully oriented to person, place, time and situation. Normal attention, concentration and fund of knowledge for education level.  MOCA 29 out of 30 (patient missed delayed recall of one item but with cueing he is able to remember).  Prior MOCA 25 out of 33 months prior.  Speech and language: Speech is fluent without errors, intact to repetition, intact to reading and intact to naming.  Cranial nerve exam:  III, IV, VI: conjugate gaze, no ptosis.  V: Sensation to light touch is normal over V1-3 distributions bilaterally.  .  VII: Facial movements are symmetrical. There is no facial droop.   VIII: Hearing intact to soft speech and finger rub bilaterally  IX:  Dysarthria is not present.    Motor exam:   Muscle tone is normal in all 4 limbs. and No abnormal movements appreciated. Prior exam    Muscle strength: Moving all extremities equally    Sensory exam: prior exam  Intact to Light touch, Temperature and Vibration in bilateral upper and lower extremity.    Deep tendon reflexes:  Prior " "exam     Right  Left  Biceps   3/4  3/4  Triceps  3/4  3/4  Brachioradialis 3/4  3/4  Knee jerk  3/4  3/4  Ankle jerk  3/4  3/4   bilateral toes are downgoing to plantar stimulation..    Coordination: No truncal ataxia  Gait: Casual gait is normal.      ANCILLARY DATA REVIEWED:   Imaging:   MRI brain without contrast March 2019: MRI of the brain without contrast within normal limits.    I have personally reviewed the patient's imaging as above and agree with the radiologist's interpretation.       ASSESSMENT AND PLAN:    1. Nonintractable, migraine without aura without status migrainosus, improved: With quality of new daily persistent headache as well on initial evaluation.  Based on timing can be secondary to chemical exposure versus chemical exposure being the trigger.  He does have a family history of migraine in his father triggered by caffeine.  Prior MRI brain was unremarkable in May 2018.  Patient declines any preventative medications including over-the-counter medications due to concerns of side effects.   - MR-BRAIN-W/O; Future -result reviewed with patient  -Continue as needed ibuprofen for severe headaches, use sparingly as discussed     2. H/O chemical exposure: Case was discussed with Poison Control Center at the time of presentation- if there was no coughing or choking that inhalation exposure is unlikely to cause systemic symptoms most concerning of which is paralysis. MRI brain stable pre-and post exposure most recent scan March 2019, no signs of metabolic changes in the brain. I did not see any immediate neurologic concerns based on my clinical evaluation today that would compromise his ability to drive or drive other people.  I cannot comment on patient's \"overall well-being\" as requested as there may be underlying pulmonary or cardiac etc. complications that I cannot evaluate. If his clinical condition changes, he will need reevaluation.  [place] 25 out of 30 in the setting of severe headache in " 2/2019 with improvement today [place] 29 out of 30.  -If patient complains of persistent memory loss consider TSH and B12, RPR, HIV depending on risk factors  -We will repeat [place] in 6 months and if symptoms improved will continue to follow as needed  -Order neuropsychological testing if worsening symptoms    3. Obesity (BMI 30.0-34.9): Chronic, will discuss weight loss when patient is ready    4. Personal history of kidney stones: Avoid Topamax    FOLLOW-UP: Return in about 6 months (around 11/20/2019).  Sooner if needed  EDUCATION AND COUNSELING:  -I personally discussed the following with the patient:   Risks/benefits/side effects/alternatives of medication including but not limited to drowsiness, sedation, dizziness, increased risk for falls, cardiovascular effects (hypotension, cardiac arrhythmias, death), weight changes, GI side effects (gastritis, ulcers, bleeding, changes in appetite, pancreatitis, change in bowel habits), liver dysfunction, renal dysfunction, kidney stones, glaucoma and increased risk for depression, anxiety, suicide, psychosis and mood changes., Diagnosis, prognosis, and treatment options discussed with patient at length.  , Discussed healthy lifestyle, including: healthy diet (rich in fruits, vegetables, nuts and healthy oils); proper hydration, and adequate sleep hygiene (allowing 7-8 hrs of overnight sleep). and Red flag symptoms to monitor for including but not limited to cognitive deficits affecting IADLs, ADLs, weakness, numbness, incoordination    The patient understands and agrees that due to the complexity of his/her diagnosis, results of any testing and further recommendations will typically be discussed/made during a face to face encounter in my office. The patient and/or family further understands it is their responsibility to keep proper follow up.     Disclaimer  This dictation was created using voice recognition software. I have made every reasonable attempt to avoid dictation  errors, but this document may contain an error not identified before finalizing. If the error changes the accuracy of the document, I would appreciate it being brought to my attention. Thank you very much.     I spent 15 minutes face-to-face in which greater than 50% of the visit was spent in counseling/coordination of care as detailed above.       Dyana Mathis MD  Neurology, Neurophysiology  [facility]

## 2019-11-20 ENCOUNTER — APPOINTMENT (OUTPATIENT)
Dept: NEUROLOGY | Facility: MEDICAL CENTER | Age: 49
End: 2019-11-20

## 2020-05-12 ENCOUNTER — OFFICE VISIT (OUTPATIENT)
Dept: NEUROLOGY | Facility: MEDICAL CENTER | Age: 50
End: 2020-05-12
Payer: COMMERCIAL

## 2020-05-12 VITALS
WEIGHT: 201 LBS | RESPIRATION RATE: 16 BRPM | TEMPERATURE: 98.8 F | DIASTOLIC BLOOD PRESSURE: 62 MMHG | SYSTOLIC BLOOD PRESSURE: 110 MMHG | OXYGEN SATURATION: 98 % | BODY MASS INDEX: 28.77 KG/M2 | HEART RATE: 68 BPM | HEIGHT: 70 IN

## 2020-05-12 DIAGNOSIS — Z87.898 H/O CHEMICAL EXPOSURE: ICD-10-CM

## 2020-05-12 DIAGNOSIS — R51.9 NONINTRACTABLE HEADACHE, UNSPECIFIED CHRONICITY PATTERN, UNSPECIFIED HEADACHE TYPE: ICD-10-CM

## 2020-05-12 PROCEDURE — 99213 OFFICE O/P EST LOW 20 MIN: CPT | Performed by: PSYCHIATRY & NEUROLOGY

## 2020-05-12 ASSESSMENT — FIBROSIS 4 INDEX: FIB4 SCORE: 0.97

## 2020-05-12 ASSESSMENT — ENCOUNTER SYMPTOMS
WEIGHT LOSS: 0
DIZZINESS: 0
HALLUCINATIONS: 0
SENSORY CHANGE: 0
NAUSEA: 0
FALLS: 0
SHORTNESS OF BREATH: 0
FEVER: 0

## 2020-05-12 ASSESSMENT — PATIENT HEALTH QUESTIONNAIRE - PHQ9: CLINICAL INTERPRETATION OF PHQ2 SCORE: 0

## 2020-05-12 NOTE — PROGRESS NOTES
Chief Complaint   Patient presents with   • Follow-Up     chronic nonintractable headache     History of present illness:  Jonn Brownchen 48 y.o. male presents today for headaches post chemical exposure follow-up.   History is obtained from patient.  and Patient is accompanied by self.    Duration/timin2018, suspect prior exposures (see below)  Context: Headache after exposure to [employer] battery lithium based electrolyte after Batteries Popped.  Reportedly Inhaled Fumes without a Mask for Approximately 15 Minutes. Almost immediately after he felt nausea/dizzy/worsening headache/not feeling himself/cognitive fog, taken to [facility]. Headaches are waxing and waning 7-8 pain, constant. Reports prior exposure in the past resulting in headache. He works for [employer] - trying to get another job as . He used to work in assembly line building battery worked for 1.5 years - he was developing headaches 3 months after starting job. Prior headaches (frontal, stabbing pains holocephalic,  improved after he was removed from assembly line and use of respirator, severity 7 constant daily for 8 months - 1 year; after removal around -2018 he improved severity to dull pain that was constant low level of pain)    Location: brain, holocephalic  Quality: pulsating  Severity: 7-8  Modifying factors: worse with lights/noise, time  Associated signs/symptoms: Nausea improved, dizziness persistent but improved, photophobia, phonophobia, worse with activity   Denies: bladder incontinence, bowel incontinence, vision changes, head trauma , weakness, numbness/tingling, swallowing difficulties, speech disturbance, incoordination, loss of consciousness, hallucinations, diplopia, aura, autonomic symptoms and snoring, ingestion, liquid exposure, pulmonary symptoms, wearing a mask in 2018, personal hx of headaches, seizures, AMS    He has an nine year old. He is on temporary disability.  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     Patient has tried:  -Tylenol, naproxen, ibuprofen - taking almost every day 12-1/2019; February 7-10/19 days so far, April 6 or less days a month  -Toradol shot    Subjective: Patient was last seen in neurology clinic on May 2019 by me.     Headaches are getting better. They do come in waves and can last 24-48 hours. About 3-4 headache days a month. Little less severe than prior but still bothersome when present. Seldom use of PRN meds as above. No new symptoms. Characteristics are otherwise unchanged compared to prior.     Memory is improved. He is on leave because casino is closed. Independent in all ADLs and IADLs. No other new symptoms such as numbness/weakness/vision change/personality change/abnormal movements.       Past medical history:   Past Medical History:   Diagnosis Date   • History of chemical exposure    • Kidney stone    • Migraine        Past surgical history:   Past Surgical History:   Procedure Laterality Date   • OTHER Bilateral     LASIK eye surgery       Family history:   Family History   Problem Relation Age of Onset   • Cancer Father         thyroid   • Other Father         migraine       Social history:   Social History   • Marital status:      Spouse name: N/A   • Number of children: 1     Social History Main Topics   • Smoking status: Former Smoker     Packs/day: 0.50     Years: 6.00     Quit date: 4/27/2009   • Smokeless tobacco: Former User   • Alcohol use No   • Drug use: No      Comment: rarely smoked marijuana when visited his brothera while ago.   • Sexual activity: Yes     Partners: Female     Current medications:   Current Outpatient Medications   Medication   • acetaminophen (TYLENOL) 325 MG Tab   • Naproxen Sodium (ALEVE) 220 MG Cap   • ibuprofen (MOTRIN) 600 MG Tab     No current facility-administered medications for this visit.        Medication Allergy:  No Known Allergies    Review of Systems   Constitutional: Negative for fever and  "weight loss.   HENT: Negative for nosebleeds.    Respiratory: Negative for shortness of breath.    Gastrointestinal: Negative for nausea.   Musculoskeletal: Negative for falls.   Skin: Negative for rash.   Neurological: Negative for dizziness and sensory change.        As per HPI   Psychiatric/Behavioral: Negative for hallucinations.       Physical examination:   Vitals:    05/12/20 1031   BP: 110/62   Pulse: 68   Resp: 16   Temp: 37.1 °C (98.8 °F)   TempSrc: Temporal   SpO2: 98%   Weight: 91.2 kg (201 lb)   Height: 1.778 m (5' 10\")     General: Patient in well nourished in no apparent distress.  HENT: Normocephalic, atraumatic. Mallapatic score 1-2  Cardiovascular: No lower extremity edema.  Respiratory: Normal respiratory effort.   Skin: No appreciable signs of acute rashes or bruising.   Musculoskeletal: No signs of joint or muscle swelling.   Psychiatric: Pleasant.  Normal affect    NEUROLOGICAL EXAM:   Mental status: Awake, alert and fully oriented to person, place, time and situation. Normal attention, concentration and fund of knowledge for education level.  MOCA 29 out of 30 (patient missed delayed recall of one item but with cueing he is able to remember).  Prior MOCA 25 out of 33 months prior. Today's  MOCA 28/30.  Speech and language: Speech is fluent without errors, intact to repetition, intact to reading and intact to naming.  Cranial nerve exam:  III, IV, VI: conjugate gaze, no ptosis.  V: Sensation to light touch is normal over V1-3 distributions bilaterally.  .  VII: Facial movements are symmetrical. There is no facial droop.   VIII: Hearing intact to soft speech  IX:  Dysarthria is not present.    Motor exam:   Muscle tone is normal in all 4 limbs. and No abnormal movements appreciated. Prior exam    Muscle strength: Moving all extremities equally    Sensory exam: prior exam  Intact to Light touch, Temperature and Vibration in bilateral upper and lower extremity.    Deep tendon reflexes:  Prior " "exam     Right  Left  Biceps   3/4  3/4  Triceps  3/4  3/4  Brachioradialis 3/4  3/4  Knee jerk  3/4  3/4  Ankle jerk  3/4  3/4   bilateral toes are downgoing to plantar stimulation..    Coordination: No truncal ataxia  Gait: Casual gait is normal.      ANCILLARY DATA REVIEWED:   Imaging: None  Labs: None  Records: None      ASSESSMENT AND PLAN:    1. Nonintractable, migraine without aura without status migrainosus, improved: Based on timing can be secondary to chemical exposure versus chemical exposure being the trigger.  He does have a family history of migraine in his father triggered by caffeine. Prior MRI brain was unremarkable in May 2018.  Repeat MRI in March 2019 without contrast was also stable and within normal limits.  Patient previously declined any preventative medications including over-the-counter medications due to concerns of side effects - I don't think he needs it given his current frequency. I suspect these headaches will be present long term though frequency is much improved.   -Continue as needed ibuprofen for severe headaches, use sparingly as discussed  -Return to clinic if worsening  -Discussed potential treatment options for chronic migraine     2. H/O chemical exposure: Case was discussed with Poison Control Center at the time of presentation- if there was no coughing or choking that inhalation exposure is unlikely to cause systemic symptoms most concerning of which is paralysis. MRI brain stable pre-and post exposure most recent scan March 2019, no signs of metabolic changes in the brain. I did not see any immediate neurologic concerns based on my clinical evaluation today that would compromise his ability to drive or drive other people.  I cannot comment on patient's \"overall well-being\" as requested as there may be underlying pulmonary or cardiac etc. complications that I cannot evaluate. If his clinical condition changes, he will need reevaluation.  [place] 25 out of 30 in the setting of " severe headache in 2/2019 stable MOCA over time.  -If patient complains of persistent memory loss consider TSH and B12, RPR, HIV depending on risk factors  -Order neuropsychological testing if worsening symptoms  -MOCA stable today    3. Obesity (BMI 30.0-34.9): Chronic, will discuss weight loss when patient is ready    4. Personal history of kidney stones: Avoid Topamax    FOLLOW-UP: Return if symptoms worsen or fail to improve.    EDUCATION AND COUNSELING:  -I personally discussed the following with the patient:   Diagnosis, prognosis, and treatment options discussed with patient at length.   and Red flag symptoms to monitor for including but not limited to cognitive deficits affecting IADLs, ADLs, weakness, numbness, incoordination. Medical reasoning as above.    The patient understands and agrees that due to the complexity of his/her diagnosis, results of any testing and further recommendations will typically be discussed/made during a face to face encounter in my office. The patient and/or family further understands it is their responsibility to keep proper follow up.     Disclaimer  This dictation was created using voice recognition software. I have made every reasonable attempt to avoid dictation errors, but this document may contain an error not identified before finalizing. If the error changes the accuracy of the document, I would appreciate it being brought to my attention. Thank you very much.     I spent 15 minutes face-to-face in which greater than 50% of the visit was spent in counseling/coordination of care as detailed above.       Dyana Mathis MD  Neurology, Neurophysiology  [facility]

## 2020-05-20 ENCOUNTER — HOSPITAL ENCOUNTER (OUTPATIENT)
Facility: MEDICAL CENTER | Age: 50
End: 2020-05-20

## 2020-05-20 ENCOUNTER — HOSPITAL ENCOUNTER (OUTPATIENT)
Facility: MEDICAL CENTER | Age: 50
End: 2020-05-20
Payer: COMMERCIAL

## 2020-05-24 LAB
SARS-COV-2 RNA SPEC QL NAA+PROBE: NOT DETECTED
SPECIMEN SOURCE: NORMAL

## 2021-04-08 ENCOUNTER — HOSPITAL ENCOUNTER (OUTPATIENT)
Facility: MEDICAL CENTER | Age: 51
End: 2021-04-08
Attending: PHYSICIAN ASSISTANT
Payer: COMMERCIAL

## 2021-04-08 ENCOUNTER — OFFICE VISIT (OUTPATIENT)
Dept: URGENT CARE | Facility: PHYSICIAN GROUP | Age: 51
End: 2021-04-08
Payer: COMMERCIAL

## 2021-04-08 VITALS
BODY MASS INDEX: 27.2 KG/M2 | OXYGEN SATURATION: 98 % | WEIGHT: 190 LBS | SYSTOLIC BLOOD PRESSURE: 120 MMHG | HEIGHT: 70 IN | DIASTOLIC BLOOD PRESSURE: 62 MMHG | RESPIRATION RATE: 18 BRPM | HEART RATE: 75 BPM | TEMPERATURE: 97.8 F

## 2021-04-08 DIAGNOSIS — R68.89 FLU-LIKE SYMPTOMS: ICD-10-CM

## 2021-04-08 DIAGNOSIS — Z20.822 CLOSE EXPOSURE TO COVID-19 VIRUS: ICD-10-CM

## 2021-04-08 LAB — COVID ORDER STATUS COVID19: NORMAL

## 2021-04-08 PROCEDURE — U0003 INFECTIOUS AGENT DETECTION BY NUCLEIC ACID (DNA OR RNA); SEVERE ACUTE RESPIRATORY SYNDROME CORONAVIRUS 2 (SARS-COV-2) (CORONAVIRUS DISEASE [COVID-19]), AMPLIFIED PROBE TECHNIQUE, MAKING USE OF HIGH THROUGHPUT TECHNOLOGIES AS DESCRIBED BY CMS-2020-01-R: HCPCS

## 2021-04-08 PROCEDURE — U0005 INFEC AGEN DETEC AMPLI PROBE: HCPCS

## 2021-04-08 PROCEDURE — 99213 OFFICE O/P EST LOW 20 MIN: CPT | Performed by: PHYSICIAN ASSISTANT

## 2021-04-08 ASSESSMENT — ENCOUNTER SYMPTOMS
DIZZINESS: 0
NAUSEA: 0
DIARRHEA: 0
FEVER: 0
SORE THROAT: 0
COUGH: 0
MYALGIAS: 1
CHILLS: 0
HEADACHES: 1
ABDOMINAL PAIN: 0
SPUTUM PRODUCTION: 0
SHORTNESS OF BREATH: 0
VOMITING: 0

## 2021-04-08 NOTE — PROGRESS NOTES
"Subjective:      Jonn Arroyo is a 50 y.o. male who presents with Body Aches (x2 days, body aches, headache, sneezing occ)            HPI  Patient presents to urgent care reporting a 3 day history of body aches, headaches, and sneezing. His girlfriend tested positive for covid-19 yesterday. No fevers, cough, chest pain, SOB, or loss of taste/smell. He has no known medical problems and doesn't take any regular medications. No known history of covid-19. He hasn't received a covid vaccination yet.       Review of Systems   Constitutional: Negative for chills and fever.   HENT: Positive for congestion. Negative for ear pain and sore throat.    Respiratory: Negative for cough, sputum production and shortness of breath.    Cardiovascular: Negative for chest pain.   Gastrointestinal: Negative for abdominal pain, diarrhea, nausea and vomiting.   Genitourinary: Negative.    Musculoskeletal: Positive for myalgias.   Skin: Negative for rash.   Neurological: Positive for headaches. Negative for dizziness.        Objective:     /62   Pulse 75   Temp 36.6 °C (97.8 °F) (Temporal)   Resp 18   Ht 1.778 m (5' 10\")   Wt 86.2 kg (190 lb)   SpO2 98%   BMI 27.26 kg/m²        Physical Exam  Vitals and nursing note reviewed.   Constitutional:       Appearance: Normal appearance. He is well-developed.   HENT:      Head: Normocephalic and atraumatic.      Right Ear: External ear normal.      Left Ear: External ear normal.      Nose:      Comments: Patient wearing mask  Eyes:      Conjunctiva/sclera: Conjunctivae normal.   Cardiovascular:      Rate and Rhythm: Normal rate and regular rhythm.      Heart sounds: Normal heart sounds.   Pulmonary:      Effort: Pulmonary effort is normal.      Breath sounds: Normal breath sounds. No wheezing or rales.   Musculoskeletal:         General: Normal range of motion.      Cervical back: Normal range of motion.   Skin:     General: Skin is warm and dry.   Neurological:      Mental " Status: He is alert and oriented to person, place, and time.   Psychiatric:         Behavior: Behavior normal.          PMH:  has a past medical history of History of chemical exposure, Kidney stone, and Migraine.  MEDS:   Current Outpatient Medications:   •  acetaminophen (TYLENOL) 325 MG Tab, Take 650 mg by mouth every four hours as needed., Disp: , Rfl:   •  Naproxen Sodium (ALEVE) 220 MG Cap, Take  by mouth., Disp: , Rfl:   •  ibuprofen (MOTRIN) 600 MG Tab, Take 600 mg by mouth every 6 hours as needed for Headache., Disp: , Rfl:   ALLERGIES: No Known Allergies  SURGHX:   Past Surgical History:   Procedure Laterality Date   • OTHER Bilateral     LASIK eye surgery     SOCHX:  reports that he quit smoking about 11 years ago. He has a 3.00 pack-year smoking history. He has quit using smokeless tobacco. He reports previous alcohol use. He reports that he does not use drugs.  FH: family history includes Cancer in his father; Other in his father.       Assessment/Plan:        1. Flu-like symptoms      2. Close exposure to COVID-19 virus    - SARS-CoV-2 PCR (24 hour In-House): Collect NP swab in VTM; Future    Advised patient symptoms are most likely viral in etiology. Increased fluids and rest. Discussed use of OTC cough and cold medication and Tylenol/Motrin for symptomatic relief.  Return for reevaluation or follow-up with PCP if symptoms persist or worsen. Supportive care, differential diagnoses, and indications for immediate follow-up discussed with patient.   Pathogenesis of diagnosis discussed including typical length and natural progression.  The patient demonstrated a good understanding and agreed with the treatment plan and has no further questions regarding care.   Vital signs stable, patient in no acute respiratory distress. Patient instructed to self-isolate/quarantine. Discharge handout given.      Discussed with patient at length importance of communal effort to help decrease the infection rate of COVID  19. Patient advised to avoid large gatherings of people and practice good hand hygiene and respiratory precautions.       This patient is evaluated under [facility] isolation protocols in urgent care.  Out of an abundance of caution I am wearing a N95 mask, protective eye gear, gloves and gown through all interaction with patient.

## 2021-04-08 NOTE — PATIENT INSTRUCTIONS
INSTRUCTIONS FOR COVID-19 OR ANY OTHER INFECTIOUS RESPIRATORY ILLNESSES    The Centers for Disease Control and Prevention (CDC) states that early indications for COVID-19 include cough, shortness of breath, difficulty breathing, or at least two of the following symptoms: chills, shaking with chills, muscle pain, headache, sore throat, and loss of taste or smell. Symptoms can range from mild to severe and may appear up to two weeks after exposure to the virus.    The practice of self-isolation and quarantine helps protect the public and your family by  preventing exposure to people who have or may have a contagious disease. Please follow the prevention steps below as based on CDC guidelines:    WHEN TO STOP ISOLATION: Persons with COVID-19 or any other infectious respiratory illness who have symptoms and were advised to care for themselves at home may discontinue home isolation under the following conditions:  · At least 24 hours have passed since recovery defined as resolution of fever without the use of fever-reducing medications; AND,  · Improvement in respiratory symptoms (e.g., cough, shortness of breath); AND,  · At least 10 days have passed since symptoms first appeared and have had no subsequent illness.    MONITOR YOUR SYMPTOMS: If your illness is worsening, seek prompt medical attention. If you have a medical emergency and need to call 911, notify the dispatch personnel that you have, or are being evaluated for confirmed or suspected COVID-19 or another infectious respiratory illness. Wear a facemask if possible.    ACTIVITY RESTRICTION: restrict activities outside your home, except for getting medical care. Do not go to work, school, or public areas. Avoid using public transportation, ride-sharing, or taxis.    SCHEDULED MEDICAL APPOINTMENTS: Notify your provider that you have, or are being evaluated for, confirmed or suspected COVID-19 or another infectious respiratory. This will help the healthcare  provider’s office safely take care of you and keep other people from getting exposed or infected.    FACEMASKS, when to wear: Anytime you are away from your home or around other people or pets. If you are unable to wear one, maintain a minimum of 6 feet distancing from others.    LIVING ENVIRONMENT: Stay in a separate room from other people and pets. If possible, use a separate bathroom, have someone else care for your pets and avoid sharing household items. Any items used should be washed thoroughly with soap and water. Clean all “high-touch” surfaces every day. Use a household cleaning spray or wipe, according to the label instructions. High touch surfaces include (but are not limited to) counters, tabletops, doorknobs, bathroom fixtures, toilets, phones, keyboards, tablets, and bedside tables.     HAND WASHING: Frequently wash hands with soap and water for at least 20 seconds,  especially after blowing your nose, coughing, or sneezing; going to the bathroom; before and after interacting with pets; and before and after eating or preparing food. If hands are visibly dirty use soap and water. If soap and water are not available, use an alcohol-based hand  with at least 60% alcohol. Avoid touching your eyes, nose, and mouth with unwashed hands. Cover your coughs and sneezes with a tissue. Throw used tissues in a lined trash can. Immediately wash your hands.    ACTIVE/FACILITATED SELF-MONITORING: Follow instructions provided by your local health department or health professionals, as appropriate. When working with your local health department check their available hours.    [facility]   Phone Number   [facility] [phone]   [facility] [place] [phone]   [place] [address]   [place] [phone]     IF YOU HAVE CONFIRMED POSITIVE COVID-19:    Those who have completely recovered from COVID-19 may have immune-boosting antibodies in their plasma--called “convalescent plasma”--that could be  used to treat critically ill COVID19 patients.    [facility] is excited to begin working with [facility] on collecting convalescent plasma from  people who have recovered from COVID-19 as part of a program to treat patients infected with the virus. This FDA-approved “emergency investigational new drug” is a special blood product containing antibodies that may give patients an extra boost to fight the virus.    To be eligible to donate convalescent plasma, you must have a prior COVID-19 diagnosis documented by a laboratory test (or a positive test result for SARS-CoV-2 antibodies) and meet additional eligibility requirements.    If you are interested in donating convalescent plasma or have any additional questions, please contact the [facility] Convalescent Plasma  at [phone] or via e-mail at [email or web address].

## 2021-04-09 LAB
SARS-COV-2 RNA RESP QL NAA+PROBE: DETECTED
SPECIMEN SOURCE: ABNORMAL

## 2022-10-15 ENCOUNTER — OFFICE VISIT (OUTPATIENT)
Dept: URGENT CARE | Facility: PHYSICIAN GROUP | Age: 52
End: 2022-10-15
Payer: COMMERCIAL

## 2022-10-15 VITALS
HEIGHT: 70 IN | HEART RATE: 78 BPM | TEMPERATURE: 98.6 F | SYSTOLIC BLOOD PRESSURE: 130 MMHG | OXYGEN SATURATION: 95 % | BODY MASS INDEX: 28.63 KG/M2 | RESPIRATION RATE: 16 BRPM | WEIGHT: 200 LBS | DIASTOLIC BLOOD PRESSURE: 88 MMHG

## 2022-10-15 DIAGNOSIS — H60.501 ACUTE OTITIS EXTERNA OF RIGHT EAR, UNSPECIFIED TYPE: ICD-10-CM

## 2022-10-15 PROCEDURE — 99213 OFFICE O/P EST LOW 20 MIN: CPT | Performed by: NURSE PRACTITIONER

## 2022-10-15 RX ORDER — AMOXICILLIN AND CLAVULANATE POTASSIUM 875; 125 MG/1; MG/1
1 TABLET, FILM COATED ORAL 2 TIMES DAILY
Qty: 20 TABLET | Refills: 0 | Status: SHIPPED | OUTPATIENT
Start: 2022-10-15 | End: 2022-10-25

## 2022-10-15 RX ORDER — NEOMYCIN SULFATE, POLYMYXIN B SULFATE AND HYDROCORTISONE 10; 3.5; 1 MG/ML; MG/ML; [USP'U]/ML
4 SUSPENSION/ DROPS AURICULAR (OTIC) 4 TIMES DAILY
Qty: 16 ML | Refills: 0 | Status: SHIPPED | OUTPATIENT
Start: 2022-10-15 | End: 2022-10-25

## 2022-10-15 NOTE — PROGRESS NOTES
Patient has consented to treatment and for use of patient information for treatment and billing purposes.    Date: 10/15/22     Arrival Mode: Private Vehicle / Ambulatory    Chief Complaint:    Chief Complaint   Patient presents with    Ear Fullness     Pressure and pain     Congestion        History of Present Illness: 52 y.o. male  presents to clinic with 1 week history of sore throat, nasal congestion mild cough and right ear fullness and pain.  Patient states he did attempt to use rubbing alcohol and upset if any care in his ear canal to help alleviate the pain which did not help.  Denies any shortness of breath chest pain or leg swelling.  No nausea vomiting diarrhea.  No fevers and or body aches.      ROS:  As stated in HPI     Pertinent Medical History:    Past Medical History:   Diagnosis Date    History of chemical exposure     Kidney stone     Migraine         Pertinent Surgical History:    Past Surgical History:   Procedure Laterality Date    OTHER Bilateral     LASIK eye surgery        Current  Medications:  Current Outpatient Medications on File Prior to Visit   Medication Sig Dispense Refill    acetaminophen (TYLENOL) 325 MG Tab Take 650 mg by mouth every four hours as needed.      Naproxen Sodium 220 MG Cap Take  by mouth.      ibuprofen (MOTRIN) 600 MG Tab Take 600 mg by mouth every 6 hours as needed for Headache.       No current facility-administered medications on file prior to visit.        Allergies:     Patient has no known allergies.     Social History:   Social History     Socioeconomic History    Marital status:      Spouse name: Not on file    Number of children: 1    Years of education: Not on file    Highest education level: Not on file   Occupational History    Not on file   Tobacco Use    Smoking status: Former     Packs/day: 0.50     Years: 6.00     Pack years: 3.00     Types: Cigarettes     Quit date: 2009     Years since quittin.4    Smokeless tobacco: Former    Substance and Sexual Activity    Alcohol use: Not Currently     Alcohol/week: 0.0 oz    Drug use: No     Comment: rarely smoked marijuana when visited his brothera while ago.    Sexual activity: Yes     Partners: Female   Other Topics Concern    Not on file   Social History Narrative    Not on file     Social Determinants of Health     Financial Resource Strain: Not on file   Food Insecurity: Not on file   Transportation Needs: Not on file   Physical Activity: Not on file   Stress: Not on file   Social Connections: Not on file   Intimate Partner Violence: Not on file   Housing Stability: Not on file        No LMP for male patient.       Physical Exam:  Vitals:    10/15/22 1539   BP: 130/88   Pulse: 78   Resp: 16   Temp: 37 °C (98.6 °F)   SpO2: 95%        Physical Exam  Constitutional:       General: He is awake.      Appearance: Normal appearance. He is not ill-appearing, toxic-appearing or diaphoretic.   HENT:      Head: Normocephalic and atraumatic.      Right Ear: Ear canal and external ear normal. Swelling (Canal is erythematous) present. Tympanic membrane is erythematous. Tympanic membrane is not bulging.      Left Ear: Tympanic membrane, ear canal and external ear normal. No swelling. Tympanic membrane is not erythematous or bulging.      Nose: Rhinorrhea present. Rhinorrhea is clear.      Mouth/Throat:      Lips: Pink.      Mouth: Mucous membranes are moist.      Tongue: No lesions.      Palate: No lesions.      Pharynx: Posterior oropharyngeal erythema present. No oropharyngeal exudate or uvula swelling.      Tonsils: No tonsillar exudate or tonsillar abscesses.   Eyes:      General: Lids are normal. Gaze aligned appropriately. No allergic shiner or scleral icterus.     Extraocular Movements: Extraocular movements intact.      Conjunctiva/sclera: Conjunctivae normal.      Pupils: Pupils are equal, round, and reactive to light.   Cardiovascular:      Rate and Rhythm: Normal rate and regular rhythm.       Pulses:           Radial pulses are 2+ on the right side and 2+ on the left side.      Heart sounds: Normal heart sounds.   Pulmonary:      Effort: Pulmonary effort is normal.      Breath sounds: Normal breath sounds and air entry. No decreased breath sounds, wheezing, rhonchi or rales.   Abdominal:      General: Abdomen is flat. Bowel sounds are normal.      Palpations: Abdomen is soft.      Tenderness: There is no abdominal tenderness.   Musculoskeletal:      Right lower leg: No edema.      Left lower leg: No edema.   Lymphadenopathy:      Cervical: No cervical adenopathy.   Skin:     General: Skin is warm.      Capillary Refill: Capillary refill takes less than 2 seconds.      Coloration: Skin is not cyanotic or pale.   Neurological:      Mental Status: He is alert and oriented to person, place, and time.      Gait: Gait is intact.   Psychiatric:         Behavior: Behavior normal. Behavior is cooperative.        Diagnostics:    None     Medical Decision Making:    Shared decision-making was utilized with patient did develop treatment plan and clinic course. Jesus, 52 y.o. male with one week ear pain. Will tx for otitis externa with topical therapy if symptoms do not improve on day 3 start Augmentin.     Did advise patient on conservative measures for management of symptoms.  Patient is agreeable to pursue adequate rest, adequate hydration, saltwater gargle and Neti pot for any symptoms of upper respiratory congestion.  Over-the-counter analgesia and antipyretics on a p.r.n. basis as needed for pain and fever.  Did discuss over-the-counter cough medications.      Patient will monitor symptoms closely for worsening and is advised to seek further evaluation the emergency room if alarm signs or symptoms arise.  Patient states understanding and verbalizes agreement with this plan of care.    Plan:    1. Acute otitis externa of right ear, unspecified type    - neomycin-polymyxin-HC (PEDIOTIC HC) 3.5-66453-2  Suspension; Administer 4 Drops into affected ear(s) 4 times a day for 10 days.  Dispense: 16 mL; Refill: 0  - amoxicillin-clavulanate (AUGMENTIN) 875-125 MG Tab; Take 1 Tablet by mouth 2 times a day for 10 days.  Dispense: 20 Tablet; Refill: 0         Disposition:  Patient was discharged in stable condition.    Voice Recognition Disclaimer: Portions of this document were created using voice recognition software. The software does have a chance of producing errors of grammar and possibly content. I have made every reasonable attempt to correct obvious errors, but there may be errors of grammar and possibly content that I did not discover before finalizing the documentation.    Jaja Cooley, [email or web address].

## 2024-01-15 ENCOUNTER — OFFICE VISIT (OUTPATIENT)
Dept: URGENT CARE | Facility: PHYSICIAN GROUP | Age: 54
End: 2024-01-15
Payer: COMMERCIAL

## 2024-01-15 VITALS
HEIGHT: 70 IN | BODY MASS INDEX: 30.21 KG/M2 | HEART RATE: 69 BPM | WEIGHT: 211 LBS | OXYGEN SATURATION: 99 % | RESPIRATION RATE: 16 BRPM | DIASTOLIC BLOOD PRESSURE: 60 MMHG | SYSTOLIC BLOOD PRESSURE: 124 MMHG | TEMPERATURE: 98 F

## 2024-01-15 DIAGNOSIS — H60.501 ACUTE OTITIS EXTERNA OF RIGHT EAR, UNSPECIFIED TYPE: ICD-10-CM

## 2024-01-15 PROCEDURE — 3074F SYST BP LT 130 MM HG: CPT

## 2024-01-15 PROCEDURE — 99213 OFFICE O/P EST LOW 20 MIN: CPT

## 2024-01-15 PROCEDURE — 3078F DIAST BP <80 MM HG: CPT

## 2024-01-15 RX ORDER — NEOMYCIN SULFATE, POLYMYXIN B SULFATE AND HYDROCORTISONE 10; 3.5; 1 MG/ML; MG/ML; [USP'U]/ML
4 SUSPENSION/ DROPS AURICULAR (OTIC) 4 TIMES DAILY
Qty: 16 ML | Refills: 0 | Status: SHIPPED | OUTPATIENT
Start: 2024-01-15 | End: 2024-01-25

## 2024-01-16 NOTE — PROGRESS NOTES
"Chief Complaint   Patient presents with    Otalgia     X 1 wk both ear pain, pressure         Subjective:   HISTORY OF PRESENT ILLNESS: Jonn Arroyo is a 53 y.o. male who presents for right ear pain and pressure for about 1 week, he reports both ears feel full but the right ear is worse   Patient denies fevers or drainage.     Medications, Allergies, current problem list, Social and Family history reviewed today in Epic.     Objective:     /60   Pulse 69   Temp 36.7 °C (98 °F)   Resp 16   Ht 1.778 m (5' 10\")   Wt 95.7 kg (211 lb)   SpO2 99%     Physical Exam  Vitals reviewed.   Constitutional:       Appearance: Normal appearance.   HENT:      Ears:      Comments: Right ear canal is red and edematous, TM is pearly white without perforation. No mid ear effusion in either ears     Mouth/Throat:      Mouth: Mucous membranes are moist.   Cardiovascular:      Rate and Rhythm: Normal rate.   Pulmonary:      Effort: Pulmonary effort is normal.   Skin:     General: Skin is warm and dry.   Neurological:      Mental Status: He is alert and oriented to person, place, and time.   Psychiatric:         Mood and Affect: Mood normal.          Assessment/Plan:     Diagnosis and associated orders    I personally reviewed prior external notes and test results pertinent to today's visit.     1. Acute otitis externa of right ear, unspecified type  neomycin-polymyxin-HC (PEDIOTIC HC) 3.5-33326-8 Suspension            IMPRESSION:  Pt has stable vital signs and no red flag symptoms identified. Exam reveals an edematous and erythematous right ear canal.   Informed pt that symptoms are consistent with acute otitis externa.  Advised to take meds as prescribed, FU with PCP if no resolution    Differential diagnosis discussed. Pt was Educated on red flag symptoms. Pt has been Instructed to return to Urgent Care or nearest Emergency Department if symptoms fail to improve, for any change in condition, further concerns, or " new concerning symptoms. Patient states understanding of the plan of care and discharge instructions.  They are discharged in stable condition.         Please note that this dictation was created using voice recognition software. I have made a reasonable attempt to correct obvious errors, but I expect that there are errors of grammar and possibly content that I did not discover before finalizing the note.    This note was electronically signed by LEIGHANN Mead